# Patient Record
Sex: FEMALE | Race: WHITE | NOT HISPANIC OR LATINO | Employment: FULL TIME | URBAN - METROPOLITAN AREA
[De-identification: names, ages, dates, MRNs, and addresses within clinical notes are randomized per-mention and may not be internally consistent; named-entity substitution may affect disease eponyms.]

---

## 2023-09-20 PROBLEM — M54.2 NECK PAIN: Status: ACTIVE | Noted: 2023-09-20

## 2023-09-20 PROBLEM — M54.50 LOW BACK PAIN: Status: ACTIVE | Noted: 2023-09-20

## 2023-09-20 PROBLEM — H57.9 EYE PROBLEMS: Status: ACTIVE | Noted: 2023-09-20

## 2023-09-20 PROBLEM — A49.01 MSSA (METHICILLIN SUSCEPTIBLE STAPHYLOCOCCUS AUREUS): Status: ACTIVE | Noted: 2023-09-20

## 2023-09-20 PROBLEM — M25.541 PAIN INVOLVING JOINT OF FINGER OF RIGHT HAND: Status: ACTIVE | Noted: 2023-09-20

## 2023-09-20 PROBLEM — M54.16 LUMBAR RADICULOPATHY: Status: ACTIVE | Noted: 2023-09-20

## 2023-09-20 PROBLEM — M71.38 SYNOVIAL CYST OF LUMBAR SPINE: Status: ACTIVE | Noted: 2023-09-20

## 2023-09-20 RX ORDER — ALPRAZOLAM 0.5 MG/1
TABLET ORAL
COMMUNITY
Start: 2022-02-14 | End: 2024-03-13 | Stop reason: WASHOUT

## 2023-09-20 RX ORDER — LIFITEGRAST 50 MG/ML
SOLUTION/ DROPS OPHTHALMIC
COMMUNITY
Start: 2021-06-11

## 2023-09-20 RX ORDER — CYCLOBENZAPRINE HCL 5 MG
1 TABLET ORAL EVERY 12 HOURS
COMMUNITY
Start: 2022-06-01 | End: 2024-03-13 | Stop reason: WASHOUT

## 2023-09-20 RX ORDER — CITALOPRAM 20 MG/1
20 TABLET, FILM COATED ORAL DAILY
COMMUNITY
Start: 2022-02-22

## 2023-09-20 RX ORDER — CELECOXIB 200 MG/1
1 CAPSULE ORAL
COMMUNITY
Start: 2022-07-27

## 2023-10-20 ENCOUNTER — ANCILLARY PROCEDURE (OUTPATIENT)
Dept: RADIOLOGY | Facility: CLINIC | Age: 53
End: 2023-10-20
Payer: COMMERCIAL

## 2023-10-20 ENCOUNTER — OFFICE VISIT (OUTPATIENT)
Dept: ORTHOPEDIC SURGERY | Facility: CLINIC | Age: 53
End: 2023-10-20
Payer: COMMERCIAL

## 2023-10-20 DIAGNOSIS — M54.50 LOW BACK PAIN, UNSPECIFIED BACK PAIN LATERALITY, UNSPECIFIED CHRONICITY, UNSPECIFIED WHETHER SCIATICA PRESENT: Primary | ICD-10-CM

## 2023-10-20 DIAGNOSIS — M54.50 LOW BACK PAIN, UNSPECIFIED BACK PAIN LATERALITY, UNSPECIFIED CHRONICITY, UNSPECIFIED WHETHER SCIATICA PRESENT: ICD-10-CM

## 2023-10-20 DIAGNOSIS — M54.2 NECK PAIN: ICD-10-CM

## 2023-10-20 PROCEDURE — 72100 X-RAY EXAM L-S SPINE 2/3 VWS: CPT | Performed by: ORTHOPAEDIC SURGERY

## 2023-10-20 PROCEDURE — 99213 OFFICE O/P EST LOW 20 MIN: CPT | Performed by: ORTHOPAEDIC SURGERY

## 2023-10-20 PROCEDURE — 72100 X-RAY EXAM L-S SPINE 2/3 VWS: CPT | Mod: FY

## 2023-10-20 NOTE — PROGRESS NOTES
Subjective   Patient ID: Julissa Chaudhari is a 53 y.o. female who presents for follow up. 1 year out from L4-5 open TLIF.    HPI:  53-year-old female here for follow-up.  She is 1 year out from an L4-5 open TLIF.  She is doing well.    Physical exam:  Well-nourished, well kept.  Good perfusion to the lower extremities bilaterally.  Dorsalis pedis pulses 2+.  Capillary refill to the digits brisk.  No distal edema.  Gait normal.  Can walk on heels and toes.  Examination of the neck reveals no swelling, step-off, or point tenderness.  Range of motion with flexion, extension, side bending and rotation is well maintained without crepitance, instability, or exacerbation of pain.  Strength is within normal limits.  Examination of the back reveals no swelling, step-off, or point tenderness.  Range of motion with flexion, extension, side bending and rotation is well maintained without crepitance, instability, or exacerbation of pain.  Strength is within normal limits.  Examination of the lower extremities reveals no point tenderness, swelling, or deformity.  Range of motion of the hips, knees, and ankles are full without crepitance, instability, or exacerbation of pain.  Strength is 5/5 throughout.  No redness, abrasions, or lesions on all 4 extremities, head and neck, or trunk.  Gross sensation intact in the extremities ×4.   Kulwant, clonus were negative.  Affect normal.  Alert and oriented ×3.  Coordination normal.    Assessment:  53-year-old female is here for follow-up she is 1 year out from a L4-5 open TLIF.  She is doing great.  99% better.  She was able to find a specialist that was able to create a carbon fiber brace for her left ankle injury allowing her to be able to walk normally now.  She is happy with the outcome of the surgery.    Plan:    For complete plan and/or surgical details, please refer to Dr. Winston's portion of this split dictation.      In a face-to-face encounter, I performed a history and  physical examination, discussed pertinent diagnostic studies if indicated, and discussed diagnosis and management strategies with both the patient and the midlevel provider.  I reviewed the midlevel's note and agree with the documented findings and plan of care.      Significantly better compared to before surgery.  Still has that chronic left foot drop.  She has an AFO that she likes very much.  Has a little bit of pain in that right leg but very tolerable.  She is very happy with how she is doing.  X-rays look good.    1 year out from an open L4-5 TLIF.  She can engage in activities as tolerated.  She is having some neck pain so we will give her prescription for therapy for that.  She can follow-up with us on a as needed basis for her neck or her back.

## 2024-03-02 ENCOUNTER — ANESTHESIA EVENT (OUTPATIENT)
Dept: OPERATING ROOM | Facility: HOSPITAL | Age: 54
End: 2024-03-02
Payer: COMMERCIAL

## 2024-03-02 ENCOUNTER — PREP FOR PROCEDURE (OUTPATIENT)
Dept: SURGERY | Facility: HOSPITAL | Age: 54
End: 2024-03-02

## 2024-03-02 ENCOUNTER — APPOINTMENT (OUTPATIENT)
Dept: RADIOLOGY | Facility: HOSPITAL | Age: 54
End: 2024-03-02
Payer: COMMERCIAL

## 2024-03-02 ENCOUNTER — ANESTHESIA (OUTPATIENT)
Dept: OPERATING ROOM | Facility: HOSPITAL | Age: 54
End: 2024-03-02
Payer: COMMERCIAL

## 2024-03-02 ENCOUNTER — APPOINTMENT (OUTPATIENT)
Dept: CARDIOLOGY | Facility: HOSPITAL | Age: 54
End: 2024-03-02
Payer: COMMERCIAL

## 2024-03-02 ENCOUNTER — HOSPITAL ENCOUNTER (EMERGENCY)
Facility: HOSPITAL | Age: 54
Discharge: HOME | End: 2024-03-02
Attending: STUDENT IN AN ORGANIZED HEALTH CARE EDUCATION/TRAINING PROGRAM
Payer: COMMERCIAL

## 2024-03-02 VITALS
SYSTOLIC BLOOD PRESSURE: 156 MMHG | OXYGEN SATURATION: 98 % | HEIGHT: 68 IN | WEIGHT: 194 LBS | RESPIRATION RATE: 18 BRPM | BODY MASS INDEX: 29.4 KG/M2 | HEART RATE: 70 BPM | TEMPERATURE: 97.5 F | DIASTOLIC BLOOD PRESSURE: 95 MMHG

## 2024-03-02 DIAGNOSIS — K61.1 PERIRECTAL ABSCESS: Primary | ICD-10-CM

## 2024-03-02 DIAGNOSIS — K61.0 PERIANAL ABSCESS: Primary | ICD-10-CM

## 2024-03-02 DIAGNOSIS — K61.1 PERIRECTAL ABSCESS: ICD-10-CM

## 2024-03-02 PROBLEM — F41.9 ANXIETY: Status: ACTIVE | Noted: 2024-03-02

## 2024-03-02 PROBLEM — E11.9 DIABETES MELLITUS (MULTI): Status: ACTIVE | Noted: 2024-03-02

## 2024-03-02 LAB
ALBUMIN SERPL BCP-MCNC: 4.3 G/DL (ref 3.4–5)
ALP SERPL-CCNC: 79 U/L (ref 33–110)
ALT SERPL W P-5'-P-CCNC: 10 U/L (ref 7–45)
ANION GAP SERPL CALC-SCNC: 13 MMOL/L (ref 10–20)
AST SERPL W P-5'-P-CCNC: 11 U/L (ref 9–39)
BASOPHILS # BLD AUTO: 0.05 X10*3/UL (ref 0–0.1)
BASOPHILS NFR BLD AUTO: 0.4 %
BILIRUB SERPL-MCNC: 0.6 MG/DL (ref 0–1.2)
BUN SERPL-MCNC: 10 MG/DL (ref 6–23)
CALCIUM SERPL-MCNC: 8.9 MG/DL (ref 8.6–10.3)
CHLORIDE SERPL-SCNC: 102 MMOL/L (ref 98–107)
CO2 SERPL-SCNC: 23 MMOL/L (ref 21–32)
CREAT SERPL-MCNC: 0.71 MG/DL (ref 0.5–1.05)
EGFRCR SERPLBLD CKD-EPI 2021: >90 ML/MIN/1.73M*2
EOSINOPHIL # BLD AUTO: 0.16 X10*3/UL (ref 0–0.7)
EOSINOPHIL NFR BLD AUTO: 1.4 %
ERYTHROCYTE [DISTWIDTH] IN BLOOD BY AUTOMATED COUNT: 12.9 % (ref 11.5–14.5)
GLUCOSE SERPL-MCNC: 99 MG/DL (ref 74–99)
HCT VFR BLD AUTO: 39.8 % (ref 36–46)
HGB BLD-MCNC: 13.2 G/DL (ref 12–16)
IMM GRANULOCYTES # BLD AUTO: 0.03 X10*3/UL (ref 0–0.7)
IMM GRANULOCYTES NFR BLD AUTO: 0.3 % (ref 0–0.9)
LYMPHOCYTES # BLD AUTO: 1.56 X10*3/UL (ref 1.2–4.8)
LYMPHOCYTES NFR BLD AUTO: 14 %
MCH RBC QN AUTO: 31.5 PG (ref 26–34)
MCHC RBC AUTO-ENTMCNC: 33.2 G/DL (ref 32–36)
MCV RBC AUTO: 95 FL (ref 80–100)
MONOCYTES # BLD AUTO: 0.89 X10*3/UL (ref 0.1–1)
MONOCYTES NFR BLD AUTO: 8 %
NEUTROPHILS # BLD AUTO: 8.47 X10*3/UL (ref 1.2–7.7)
NEUTROPHILS NFR BLD AUTO: 75.9 %
NRBC BLD-RTO: 0 /100 WBCS (ref 0–0)
PLATELET # BLD AUTO: 338 X10*3/UL (ref 150–450)
POTASSIUM SERPL-SCNC: 3.9 MMOL/L (ref 3.5–5.3)
PROT SERPL-MCNC: 7.5 G/DL (ref 6.4–8.2)
RBC # BLD AUTO: 4.19 X10*6/UL (ref 4–5.2)
SODIUM SERPL-SCNC: 134 MMOL/L (ref 136–145)
WBC # BLD AUTO: 11.2 X10*3/UL (ref 4.4–11.3)

## 2024-03-02 PROCEDURE — 2720000007 HC OR 272 NO HCPCS: Performed by: SURGERY

## 2024-03-02 PROCEDURE — 74177 CT ABD & PELVIS W/CONTRAST: CPT

## 2024-03-02 PROCEDURE — 7100000009 HC PHASE TWO TIME - INITIAL BASE CHARGE: Performed by: SURGERY

## 2024-03-02 PROCEDURE — 84075 ASSAY ALKALINE PHOSPHATASE: CPT | Performed by: STUDENT IN AN ORGANIZED HEALTH CARE EDUCATION/TRAINING PROGRAM

## 2024-03-02 PROCEDURE — 99285 EMERGENCY DEPT VISIT HI MDM: CPT | Mod: 25 | Performed by: SURGERY

## 2024-03-02 PROCEDURE — 99284 EMERGENCY DEPT VISIT MOD MDM: CPT | Performed by: REGISTERED NURSE

## 2024-03-02 PROCEDURE — 2500000004 HC RX 250 GENERAL PHARMACY W/ HCPCS (ALT 636 FOR OP/ED): Performed by: STUDENT IN AN ORGANIZED HEALTH CARE EDUCATION/TRAINING PROGRAM

## 2024-03-02 PROCEDURE — 3600000008 HC OR TIME - EACH INCREMENTAL 1 MINUTE - PROCEDURE LEVEL THREE: Performed by: SURGERY

## 2024-03-02 PROCEDURE — 3600000003 HC OR TIME - INITIAL BASE CHARGE - PROCEDURE LEVEL THREE: Performed by: SURGERY

## 2024-03-02 PROCEDURE — 99140 ANES COMP EMERGENCY COND: CPT | Performed by: ANESTHESIOLOGY

## 2024-03-02 PROCEDURE — 46040 I&D ISCHIORCT&/PERIRCT ABSC: CPT | Performed by: SURGERY

## 2024-03-02 PROCEDURE — 2500000005 HC RX 250 GENERAL PHARMACY W/O HCPCS: Performed by: ANESTHESIOLOGIST ASSISTANT

## 2024-03-02 PROCEDURE — 2500000001 HC RX 250 WO HCPCS SELF ADMINISTERED DRUGS (ALT 637 FOR MEDICARE OP): Performed by: STUDENT IN AN ORGANIZED HEALTH CARE EDUCATION/TRAINING PROGRAM

## 2024-03-02 PROCEDURE — 2550000001 HC RX 255 CONTRASTS: Performed by: STUDENT IN AN ORGANIZED HEALTH CARE EDUCATION/TRAINING PROGRAM

## 2024-03-02 PROCEDURE — 7100000001 HC RECOVERY ROOM TIME - INITIAL BASE CHARGE: Performed by: SURGERY

## 2024-03-02 PROCEDURE — 85025 COMPLETE CBC W/AUTO DIFF WBC: CPT | Performed by: STUDENT IN AN ORGANIZED HEALTH CARE EDUCATION/TRAINING PROGRAM

## 2024-03-02 PROCEDURE — A46040 PR I AND D PERIRECTAL ABSCESS: Performed by: ANESTHESIOLOGIST ASSISTANT

## 2024-03-02 PROCEDURE — A46040 PR I AND D PERIRECTAL ABSCESS: Performed by: ANESTHESIOLOGY

## 2024-03-02 PROCEDURE — 7100000002 HC RECOVERY ROOM TIME - EACH INCREMENTAL 1 MINUTE: Performed by: SURGERY

## 2024-03-02 PROCEDURE — 7100000010 HC PHASE TWO TIME - EACH INCREMENTAL 1 MINUTE: Performed by: SURGERY

## 2024-03-02 PROCEDURE — 96365 THER/PROPH/DIAG IV INF INIT: CPT | Mod: 59 | Performed by: SURGERY

## 2024-03-02 PROCEDURE — 93005 ELECTROCARDIOGRAM TRACING: CPT | Mod: 59

## 2024-03-02 PROCEDURE — 99223 1ST HOSP IP/OBS HIGH 75: CPT | Performed by: SURGERY

## 2024-03-02 PROCEDURE — 96375 TX/PRO/DX INJ NEW DRUG ADDON: CPT | Mod: 59 | Performed by: SURGERY

## 2024-03-02 PROCEDURE — 74177 CT ABD & PELVIS W/CONTRAST: CPT | Performed by: RADIOLOGY

## 2024-03-02 PROCEDURE — 3700000002 HC GENERAL ANESTHESIA TIME - EACH INCREMENTAL 1 MINUTE: Performed by: SURGERY

## 2024-03-02 PROCEDURE — 87185 SC STD ENZYME DETCJ PER NZM: CPT | Mod: PARLAB | Performed by: SURGERY

## 2024-03-02 PROCEDURE — 36415 COLL VENOUS BLD VENIPUNCTURE: CPT | Performed by: STUDENT IN AN ORGANIZED HEALTH CARE EDUCATION/TRAINING PROGRAM

## 2024-03-02 PROCEDURE — 2500000005 HC RX 250 GENERAL PHARMACY W/O HCPCS: Performed by: SURGERY

## 2024-03-02 PROCEDURE — 2500000005 HC RX 250 GENERAL PHARMACY W/O HCPCS: Performed by: STUDENT IN AN ORGANIZED HEALTH CARE EDUCATION/TRAINING PROGRAM

## 2024-03-02 PROCEDURE — 2500000004 HC RX 250 GENERAL PHARMACY W/ HCPCS (ALT 636 FOR OP/ED): Performed by: ANESTHESIOLOGIST ASSISTANT

## 2024-03-02 PROCEDURE — 3700000001 HC GENERAL ANESTHESIA TIME - INITIAL BASE CHARGE: Performed by: SURGERY

## 2024-03-02 RX ORDER — DIPHENHYDRAMINE HYDROCHLORIDE 50 MG/ML
12.5 INJECTION INTRAMUSCULAR; INTRAVENOUS ONCE AS NEEDED
Status: DISCONTINUED | OUTPATIENT
Start: 2024-03-02 | End: 2024-03-02 | Stop reason: HOSPADM

## 2024-03-02 RX ORDER — FENTANYL CITRATE 50 UG/ML
INJECTION, SOLUTION INTRAMUSCULAR; INTRAVENOUS AS NEEDED
Status: DISCONTINUED | OUTPATIENT
Start: 2024-03-02 | End: 2024-03-02

## 2024-03-02 RX ORDER — PROPOFOL 10 MG/ML
INJECTION, EMULSION INTRAVENOUS AS NEEDED
Status: DISCONTINUED | OUTPATIENT
Start: 2024-03-02 | End: 2024-03-02

## 2024-03-02 RX ORDER — MEPERIDINE HYDROCHLORIDE 25 MG/ML
12.5 INJECTION INTRAMUSCULAR; INTRAVENOUS; SUBCUTANEOUS EVERY 10 MIN PRN
Status: DISCONTINUED | OUTPATIENT
Start: 2024-03-02 | End: 2024-03-02 | Stop reason: HOSPADM

## 2024-03-02 RX ORDER — SODIUM CHLORIDE, SODIUM LACTATE, POTASSIUM CHLORIDE, CALCIUM CHLORIDE 600; 310; 30; 20 MG/100ML; MG/100ML; MG/100ML; MG/100ML
100 INJECTION, SOLUTION INTRAVENOUS CONTINUOUS
Status: DISCONTINUED | OUTPATIENT
Start: 2024-03-02 | End: 2024-03-02 | Stop reason: HOSPADM

## 2024-03-02 RX ORDER — KETOROLAC TROMETHAMINE 30 MG/ML
INJECTION, SOLUTION INTRAMUSCULAR; INTRAVENOUS AS NEEDED
Status: DISCONTINUED | OUTPATIENT
Start: 2024-03-02 | End: 2024-03-02

## 2024-03-02 RX ORDER — TRAMADOL HYDROCHLORIDE 50 MG/1
50 TABLET ORAL EVERY 6 HOURS PRN
Qty: 12 TABLET | Refills: 0 | Status: SHIPPED | OUTPATIENT
Start: 2024-03-02 | End: 2024-03-03 | Stop reason: SDUPTHER

## 2024-03-02 RX ORDER — IPRATROPIUM BROMIDE 0.5 MG/2.5ML
500 SOLUTION RESPIRATORY (INHALATION) ONCE
Status: DISCONTINUED | OUTPATIENT
Start: 2024-03-02 | End: 2024-03-02 | Stop reason: HOSPADM

## 2024-03-02 RX ORDER — ONDANSETRON HYDROCHLORIDE 2 MG/ML
INJECTION, SOLUTION INTRAVENOUS AS NEEDED
Status: DISCONTINUED | OUTPATIENT
Start: 2024-03-02 | End: 2024-03-02

## 2024-03-02 RX ORDER — BUPIVACAINE HYDROCHLORIDE 5 MG/ML
INJECTION, SOLUTION PERINEURAL AS NEEDED
Status: DISCONTINUED | OUTPATIENT
Start: 2024-03-02 | End: 2024-03-02 | Stop reason: HOSPADM

## 2024-03-02 RX ORDER — ACETAMINOPHEN 325 MG/1
650 TABLET ORAL EVERY 4 HOURS PRN
Status: DISCONTINUED | OUTPATIENT
Start: 2024-03-02 | End: 2024-03-02 | Stop reason: HOSPADM

## 2024-03-02 RX ORDER — ALBUTEROL SULFATE 0.83 MG/ML
2.5 SOLUTION RESPIRATORY (INHALATION) ONCE AS NEEDED
Status: DISCONTINUED | OUTPATIENT
Start: 2024-03-02 | End: 2024-03-02 | Stop reason: HOSPADM

## 2024-03-02 RX ORDER — ACETAMINOPHEN 325 MG/1
975 TABLET ORAL ONCE
Status: COMPLETED | OUTPATIENT
Start: 2024-03-02 | End: 2024-03-02

## 2024-03-02 RX ORDER — LIDOCAINE HYDROCHLORIDE 10 MG/ML
0.1 INJECTION INFILTRATION; PERINEURAL ONCE
Status: DISCONTINUED | OUTPATIENT
Start: 2024-03-02 | End: 2024-03-02 | Stop reason: HOSPADM

## 2024-03-02 RX ORDER — DEXAMETHASONE SODIUM PHOSPHATE 10 MG/ML
6 INJECTION INTRAMUSCULAR; INTRAVENOUS ONCE
Status: DISCONTINUED | OUTPATIENT
Start: 2024-03-02 | End: 2024-03-02 | Stop reason: HOSPADM

## 2024-03-02 RX ORDER — ONDANSETRON HYDROCHLORIDE 2 MG/ML
4 INJECTION, SOLUTION INTRAVENOUS ONCE
Status: COMPLETED | OUTPATIENT
Start: 2024-03-02 | End: 2024-03-02

## 2024-03-02 RX ORDER — ONDANSETRON HYDROCHLORIDE 2 MG/ML
4 INJECTION, SOLUTION INTRAVENOUS ONCE AS NEEDED
Status: DISCONTINUED | OUTPATIENT
Start: 2024-03-02 | End: 2024-03-02 | Stop reason: HOSPADM

## 2024-03-02 RX ORDER — LIDOCAINE HCL/PF 100 MG/5ML
SYRINGE (ML) INTRAVENOUS AS NEEDED
Status: DISCONTINUED | OUTPATIENT
Start: 2024-03-02 | End: 2024-03-02

## 2024-03-02 RX ORDER — KETOROLAC TROMETHAMINE 30 MG/ML
30 INJECTION, SOLUTION INTRAMUSCULAR; INTRAVENOUS ONCE AS NEEDED
Status: DISCONTINUED | OUTPATIENT
Start: 2024-03-02 | End: 2024-03-02 | Stop reason: HOSPADM

## 2024-03-02 RX ORDER — DEXAMETHASONE SODIUM PHOSPHATE 4 MG/ML
INJECTION, SOLUTION INTRA-ARTICULAR; INTRALESIONAL; INTRAMUSCULAR; INTRAVENOUS; SOFT TISSUE AS NEEDED
Status: DISCONTINUED | OUTPATIENT
Start: 2024-03-02 | End: 2024-03-02

## 2024-03-02 RX ORDER — MIDAZOLAM HYDROCHLORIDE 1 MG/ML
INJECTION, SOLUTION INTRAMUSCULAR; INTRAVENOUS AS NEEDED
Status: DISCONTINUED | OUTPATIENT
Start: 2024-03-02 | End: 2024-03-02

## 2024-03-02 RX ORDER — LIDOCAINE 560 MG/1
1 PATCH PERCUTANEOUS; TOPICAL; TRANSDERMAL DAILY
Status: DISCONTINUED | OUTPATIENT
Start: 2024-03-02 | End: 2024-03-02 | Stop reason: HOSPADM

## 2024-03-02 RX ORDER — AMOXICILLIN AND CLAVULANATE POTASSIUM 875; 125 MG/1; MG/1
875 TABLET, FILM COATED ORAL 2 TIMES DAILY
Qty: 14 TABLET | Refills: 0 | Status: SHIPPED | OUTPATIENT
Start: 2024-03-02 | End: 2024-03-09

## 2024-03-02 RX ORDER — OXYCODONE HYDROCHLORIDE 5 MG/1
5 TABLET ORAL ONCE
Status: COMPLETED | OUTPATIENT
Start: 2024-03-02 | End: 2024-03-02

## 2024-03-02 RX ADMIN — DEXAMETHASONE SODIUM PHOSPHATE 4 MG: 4 INJECTION, SOLUTION INTRAMUSCULAR; INTRAVENOUS at 14:32

## 2024-03-02 RX ADMIN — ONDANSETRON 4 MG: 2 INJECTION INTRAMUSCULAR; INTRAVENOUS at 12:47

## 2024-03-02 RX ADMIN — FENTANYL CITRATE 50 MCG: 50 INJECTION, SOLUTION INTRAMUSCULAR; INTRAVENOUS at 14:55

## 2024-03-02 RX ADMIN — ACETAMINOPHEN 975 MG: 325 TABLET ORAL at 09:13

## 2024-03-02 RX ADMIN — PROPOFOL 200 MG: 10 INJECTION, EMULSION INTRAVENOUS at 14:27

## 2024-03-02 RX ADMIN — LIDOCAINE 1 PATCH: 4 PATCH TOPICAL at 09:13

## 2024-03-02 RX ADMIN — KETOROLAC TROMETHAMINE 30 MG: 30 INJECTION, SOLUTION INTRAMUSCULAR; INTRAVENOUS at 15:04

## 2024-03-02 RX ADMIN — LIDOCAINE HYDROCHLORIDE 100 MG: 20 INJECTION INTRAVENOUS at 14:27

## 2024-03-02 RX ADMIN — SODIUM CHLORIDE, POTASSIUM CHLORIDE, SODIUM LACTATE AND CALCIUM CHLORIDE: 600; 310; 30; 20 INJECTION, SOLUTION INTRAVENOUS at 14:21

## 2024-03-02 RX ADMIN — ACETAMINOPHEN 650 MG: 325 TABLET ORAL at 16:01

## 2024-03-02 RX ADMIN — MIDAZOLAM 2 MG: 1 INJECTION INTRAMUSCULAR; INTRAVENOUS at 14:21

## 2024-03-02 RX ADMIN — HYDROMORPHONE HYDROCHLORIDE 0.5 MG: 1 INJECTION, SOLUTION INTRAMUSCULAR; INTRAVENOUS; SUBCUTANEOUS at 12:47

## 2024-03-02 RX ADMIN — IOHEXOL 80 ML: 350 INJECTION, SOLUTION INTRAVENOUS at 10:49

## 2024-03-02 RX ADMIN — FENTANYL CITRATE 100 MCG: 50 INJECTION, SOLUTION INTRAMUSCULAR; INTRAVENOUS at 14:27

## 2024-03-02 RX ADMIN — OXYCODONE HYDROCHLORIDE 5 MG: 5 TABLET ORAL at 09:13

## 2024-03-02 RX ADMIN — ONDANSETRON 4 MG: 2 INJECTION INTRAMUSCULAR; INTRAVENOUS at 15:04

## 2024-03-02 RX ADMIN — PIPERACILLIN SODIUM AND TAZOBACTAM SODIUM 3.38 G: 3; .375 INJECTION, SOLUTION INTRAVENOUS at 13:58

## 2024-03-02 SDOH — HEALTH STABILITY: MENTAL HEALTH: CURRENT SMOKER: 0

## 2024-03-02 ASSESSMENT — PAIN - FUNCTIONAL ASSESSMENT
PAIN_FUNCTIONAL_ASSESSMENT: 0-10

## 2024-03-02 ASSESSMENT — PAIN SCALES - GENERAL
PAINLEVEL_OUTOF10: 10 - WORST POSSIBLE PAIN
PAINLEVEL_OUTOF10: 5 - MODERATE PAIN
PAINLEVEL_OUTOF10: 0 - NO PAIN
PAINLEVEL_OUTOF10: 0 - NO PAIN
PAIN_LEVEL: 0
PAINLEVEL_OUTOF10: 0 - NO PAIN
PAINLEVEL_OUTOF10: 7

## 2024-03-02 ASSESSMENT — COLUMBIA-SUICIDE SEVERITY RATING SCALE - C-SSRS
2. HAVE YOU ACTUALLY HAD ANY THOUGHTS OF KILLING YOURSELF?: NO
1. IN THE PAST MONTH, HAVE YOU WISHED YOU WERE DEAD OR WISHED YOU COULD GO TO SLEEP AND NOT WAKE UP?: NO
6. HAVE YOU EVER DONE ANYTHING, STARTED TO DO ANYTHING, OR PREPARED TO DO ANYTHING TO END YOUR LIFE?: NO

## 2024-03-02 ASSESSMENT — LIFESTYLE VARIABLES
EVER HAD A DRINK FIRST THING IN THE MORNING TO STEADY YOUR NERVES TO GET RID OF A HANGOVER: NO
HAVE YOU EVER FELT YOU SHOULD CUT DOWN ON YOUR DRINKING: NO
EVER FELT BAD OR GUILTY ABOUT YOUR DRINKING: NO
HAVE PEOPLE ANNOYED YOU BY CRITICIZING YOUR DRINKING: NO

## 2024-03-02 ASSESSMENT — PAIN DESCRIPTION - LOCATION: LOCATION: BUTTOCKS

## 2024-03-02 NOTE — ANESTHESIA PREPROCEDURE EVALUATION
Patient: Julissa Chaudhari    Procedure Information       Date/Time: 03/02/24 1414    Procedure: Incision and Drainage Anus/Rectum (Left) - Rectal exam under anesthesia; incision and drainage of left ischial rectal abscess; general anesthesia;  lithotomy position with candycane stirrups    Location: [facility] 02 / [facility]    Surgeons: Lexa Pillai MD            Relevant Problems   Neuro/Psych   (+) Anxiety   (+) Lumbar radiculopathy   (+) Panic attacks      Infectious Disease   (+) MSSA (methicillin susceptible Staphylococcus aureus)       Clinical information reviewed:    Allergies   Problems              NPO Detail:  No data recorded     Physical Exam    Airway  Mallampati: IV  TM distance: <3 FB  Neck ROM: full     Cardiovascular - normal exam  Rhythm: regular  Rate: normal     Dental - normal exam     Pulmonary - normal exam     Abdominal            Anesthesia Plan    History of general anesthesia?: yes  History of complications of general anesthesia?: no    ASA 3 - emergent     general     The patient is not a current smoker.    intravenous induction   Postoperative administration of opioids is intended.  Trial extubation is planned.  Anesthetic plan and risks discussed with patient.  Use of blood products discussed with patient who consented to blood products.    Plan discussed with [facility]AA.

## 2024-03-02 NOTE — DISCHARGE INSTRUCTIONS
For postoperative analgesia/pain relief, I recommend:     a.  Tylenol Extra Strength 500 mg with ibuprofen 400 - 600 mg (two or three, 200 mg Advil or Motrin tablets ) 4 times a day with food, on schedule, for 2-3 days, then as needed thereafter.      b.  Supplement with Tramadol 50 mg, dispense #12 for more severe or breakthrough pain, as needed.  This has been electronically prescribed to your pharmacy.  Please  this prescription within 7 days of today's visit, or the pharmacist will not fill the prescription.

## 2024-03-02 NOTE — ANESTHESIA POSTPROCEDURE EVALUATION
Patient: Julissa Chaudhari    Procedure Summary       Date: 03/02/24 Room / Location: [facility] / [facility]    Anesthesia Start: 1421 Anesthesia Stop: 1517    Procedure: Incision and Drainage Anus/Rectum (Left) Diagnosis:       Perirectal abscess      (Perirectal abscess [K61.1])    Surgeons: Lexa Pillai MD Responsible Provider: Spenser Ford MD    Anesthesia Type: general ASA Status: 3 - Emergent            Anesthesia Type: general    Vitals Value Taken Time   /64 03/02/24 1515   Temp 36.4 °C (97.5 °F) 03/02/24 1515   Pulse 73 03/02/24 1515   Resp 16 03/02/24 1515   SpO2 100 % 03/02/24 1515       Anesthesia Post Evaluation    Patient location during evaluation: PACU  Patient participation: complete - patient participated  Level of consciousness: awake and alert  Pain score: 0  Pain management: adequate  Airway patency: patent  Cardiovascular status: acceptable  Respiratory status: acceptable  Hydration status: acceptable  Postoperative Nausea and Vomiting: none        There were no known notable events for this encounter.

## 2024-03-02 NOTE — H&P
History Of Present Illness  Julissa Chaudhari is a 53 y.o. female who presented to [facility] ED on 3/2 with worsening rectal pain. She has a history of anal fistula s/p repair in 2014. Earlier this week, around Tuesday or Wednesday, patient noticed a mild pain in her rectum. At first, she thought she possibly had hemorrhoids. Attempted to use Preparation H, but this did not help. While applying the cream on Thursday night, she noticed a lump just next to her previous scar. She never noted any blood in her stool or drainage from rectum. Developed chills on Friday night after getting home from work. The pain became so severe Friday; rates it as 10/10 and states she couldn't walk, sit down, or get comfortable in any way. Came to the ED this morning after having near syncope in the shower. Denies history of syncope. Felt lightheaded and dizzy; attributed this to hot shower and degree of pain in rectum.   Even after IV hydromorphone and oxycodone in ED, still rates pain as 7/10. All labs normal except for elevated absolute neutrophil count. CT A/P concerning for perianal abscess with inflammatory changes and central density measuring 3x2cm. Despite relatively large fluid collection noted on CT, on physical exam this is not easily palpable; however, patient with thickened scar tissue from fistula scar.      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 Lives in [place] with friend Yanely    Family History  No family history on file.     Allergies  Patient has no known allergies.    Review of Systems  "  All other systems reviewed and are negative.       Physical Exam  Constitutional:       General: She is not in acute distress.     Appearance: She is normal weight. She is not ill-appearing or toxic-appearing.   HENT:      Head: Normocephalic.      Mouth/Throat:      Mouth: Mucous membranes are moist.   Eyes:      General: No scleral icterus.     Conjunctiva/sclera: Conjunctivae normal.      Pupils: Pupils are equal, round, and reactive to light.   Cardiovascular:      Rate and Rhythm: Normal rate and regular rhythm.      Pulses: Normal pulses.      Heart sounds: No murmur heard.  Pulmonary:      Effort: Pulmonary effort is normal. No respiratory distress.      Breath sounds: Normal breath sounds.   Abdominal:      General: Bowel sounds are normal. There is no distension.      Palpations: Abdomen is soft.      Tenderness: There is no abdominal tenderness.      Comments: Left perianal scar noted from prior surgery. Firm palpable scar tissue. Mild erythema only. Very tender to palpate. No drainage. No obvious palpable fluctuance    Skin:     General: Skin is warm and dry.      Coloration: Skin is not jaundiced.   Neurological:      Mental Status: She is alert and oriented to person, place, and time.   Psychiatric:         Mood and Affect: Mood normal.         Behavior: Behavior normal.          Last Recorded Vitals  Blood pressure 119/60, pulse 64, temperature 37 °C (98.6 °F), resp. rate 14, height 1.727 m (5' 8\"), weight 88 kg (194 lb), SpO2 99 %.    Relevant Results  Results for orders placed or performed during the hospital encounter of 03/02/24 (from the past 24 hour(s))   CBC and Auto Differential   Result Value Ref Range    WBC 11.2 4.4 - 11.3 x10*3/uL    nRBC 0.0 0.0 - 0.0 /100 WBCs    RBC 4.19 4.00 - 5.20 x10*6/uL    Hemoglobin 13.2 12.0 - 16.0 g/dL    Hematocrit 39.8 36.0 - 46.0 %    MCV 95 80 - 100 fL    MCH 31.5 26.0 - 34.0 pg    MCHC 33.2 32.0 - 36.0 g/dL    RDW 12.9 11.5 - 14.5 %    Platelets 338 150 - " 450 x10*3/uL    Neutrophils % 75.9 40.0 - 80.0 %    Immature Granulocytes %, Automated 0.3 0.0 - 0.9 %    Lymphocytes % 14.0 13.0 - 44.0 %    Monocytes % 8.0 2.0 - 10.0 %    Eosinophils % 1.4 0.0 - 6.0 %    Basophils % 0.4 0.0 - 2.0 %    Neutrophils Absolute 8.47 (H) 1.20 - 7.70 x10*3/uL    Immature Granulocytes Absolute, Automated 0.03 0.00 - 0.70 x10*3/uL    Lymphocytes Absolute 1.56 1.20 - 4.80 x10*3/uL    Monocytes Absolute 0.89 0.10 - 1.00 x10*3/uL    Eosinophils Absolute 0.16 0.00 - 0.70 x10*3/uL    Basophils Absolute 0.05 0.00 - 0.10 x10*3/uL   Comprehensive metabolic panel   Result Value Ref Range    Glucose 99 74 - 99 mg/dL    Sodium 134 (L) 136 - 145 mmol/L    Potassium 3.9 3.5 - 5.3 mmol/L    Chloride 102 98 - 107 mmol/L    Bicarbonate 23 21 - 32 mmol/L    Anion Gap 13 10 - 20 mmol/L    Urea Nitrogen 10 6 - 23 mg/dL    Creatinine 0.71 0.50 - 1.05 mg/dL    eGFR >90 >60 mL/min/1.73m*2    Calcium 8.9 8.6 - 10.3 mg/dL    Albumin 4.3 3.4 - 5.0 g/dL    Alkaline Phosphatase 79 33 - 110 U/L    Total Protein 7.5 6.4 - 8.2 g/dL    AST 11 9 - 39 U/L    Bilirubin, Total 0.6 0.0 - 1.2 mg/dL    ALT 10 7 - 45 U/L       CT abdomen pelvis w IV contrast  Result Date: 3/2/2024    1.  Findings suspicious for inferior perianal abscess/inflammatory tract although central density measurement is greater than simple fluid. Alternative cause such as hematoma or mass not definitively excluded. 2. Small liver hypodensities which are most likely incidental although too small to characterize. See below incidental findings prompt for recommendations.   Incidental Finding:  There is a hypodense lesion measuring less than 10 mm within the liver.  (**-YCF-**)   Instructions:  No known malignancy, hepatic dysfunction/risk factors: favor benign etiology, no further imaging recommended. High-risk patients: consider outpatient liver MRI in 3-6 months (or earlier if needed). Ricardo RM, Caty PJ, Amber CINTRON, et al. (Management of Incidental  Liver Lesions on CT: A White Paper of the ACR Incidental Findings Committee. J Am Mansi Radiol. 2017;14(11):7662-2126.) LIVER.ACR.IF.1   Signed by: Philippe España 3/2/2024 11:40 AM Dictation workstation:   [id number]          Assessment/Plan   Active Problems:  There are no active Hospital Problems.      53 year old female with a past medical history significant for anal fistula s/p repair (2014), HLD, colon polyps (removal of cecal adenoma 2023), chronic back pain s/p L4-5 surgery last year, and hemorrhoids presented to [facility] ED 3/2 with several days of worsening rectal pain.  Physical exam and CT findings consistent with perianal abscess. Plan for OR for exam under anesthesia and I&D.    #perianal abscess  - NPO  - IV antibiotics to be given in OR  - OR for exam under anesthesia  - I&D of fluid collection to be performed in OR; culture will be sent  - pending OR findings, could possibly be discharged from PACU. If admission indicated, will admit under obs status to surgery team    The patient was seen and discussed with the attending surgeon Dr. Pillai     I spent 30 minutes in the professional and overall care of this patient.  Greater than 50% of this time was spent counseling patient, reviewing plan of care, and in coordination of care.       Marisol Verdugo, NIXON-CNP

## 2024-03-02 NOTE — OP NOTE
Incision and Drainage Anus/Rectum (L) Operative Note     Date: 3/2/2024  OR Location: [facility]    Name: Julissa Chaudhari, : 1970, Age: 53 y.o., MRN: [id number], Sex: female    Diagnosis  Pre-op Diagnosis     * Perirectal abscess [K61.1] Post-op Diagnosis     * Perirectal abscess [K61.1]     Procedures  Incision and Drainage Anus/Rectum  38725 - SD I&D ISCHIORECTAL&/PERIRECTAL ABSCESS SPX    Rectal exam under anesthesia    Diagnostic anoscopy    Surgeons      * Lexa Pillai - Primary    Resident/Fellow/Other Assistant:  Surgeon(s) and Role: Bulmaro Dowling SA    Procedure Summary  Anesthesia: General  ASA: ASA status not filed in the log.  Anesthesia Staff: No anesthesia staff entered.  Estimated Blood Loss: Less than 5 mL  Intra-op Medications:   Administrations occurring from 1415 to 1445 on 24:   Medication Name Total Dose   piperacillin-tazobactam-dextrose (Zosyn) IV 3.375 g Cannot be calculated              Anesthesia Record               Intraprocedure I/O Totals       None           Specimen: No specimens collected     Staff:   Circulator: Angeli Awad RN  Scrub Person: Tommy Campoverde         Drains and/or Catheters: * None in log *    Tourniquet Times:         Implants:     Findings: See below    Indications: Julissa Chaudhari is an 53 y.o. female who is having surgery for Perirectal abscess [K61.1].     The patient was seen in the preoperative area. The risks, benefits, complications, treatment options, non-operative alternatives, expected recovery and outcomes were discussed with the patient. The possibilities of reaction to medication, pulmonary aspiration, injury to surrounding structures, bleeding, recurrent infection, the need for additional procedures, failure to diagnose a condition, and creating a complication requiring transfusion or operation were discussed with the patient. The patient concurred with the proposed plan, giving informed consent.  The site of surgery was properly  noted/marked if necessary per policy. The patient has been actively warmed in preoperative area. Preoperative antibiotics have been ordered and given within 1 hours of incision. Venous thrombosis prophylaxis have been ordered including bilateral sequential compression devices    Procedure Details:     Findings:   The patient had a firm 4 cm radial scar directly left laterally; there is very minimal erythema of the surrounding skin; a palpable fluctuant mass could not be palpated; no other signs on inspection; digital rectal exam revealed no bulge or mass along the anal canal particularly left laterally; diagnostic anoscopy was performed and revealed no evidence of an internal fistulous opening; this was essentially negative; beneath the scar, there was an abscess containing 6 cc of thick purulent fluid     Specimens:  None    Culture:  Abscess    Procedure:     The patient was taken to the operating room placed on the table in the supine position. Preoperative huddle was accomplished with the OR team and the patient. Satisfactory general endotracheal anesthesia was achieved.  The patient was placed in the perineal lithotomy position using candycane stirrups.  The  perianal region and perineum and external genitalia were prepared and draped in normal sterile fashion. The surgical site was marked by the surgeon preoperatively. After the appropriate timeout, the case commenced.     Using an 18-gauge needle and 10 cc syringe, the soft tissue beneath the scar was aspirated, and 6 cc of purulent fluid was obtained, and this was sent to microbiology for culture.      Secondary to the small size of the abscess, and the thick chronic scar, it was elected not to widely open this area through the scar.  2 cm lateral to the scar, a transverse counterincision was made roughly 2 cm in diameter.  Using a tonsil clamp, a tract was made medially to the abscess cavity.  This tract was opened.  No further purulent fluid was obtained.   Following this, the tract and abscess cavity was bluntly finger dissected and there were really no loculations or other remaining fluid.  This tract and abscess cavity were then copiously irrigated and suctioned with saline.  A 22 Bengali Pezzer (Malecot) drain was then placed along this tract, with a mushroom portion into the abscess cavity.  This was secured to the skin using a 2-0 nylon suture.  This area was cleaned and dried, and the exterior drain was packed and ABD pads and 4 x 4 gauze, followed by mesh panties.  This completed the operation.    The patient tolerated the procedure well. Sponge, needle, and instrument counts were correct times 2. Total IVF were 400 cc of crystalloid, EBL was < 5 cc, and urine output was not measured.  The patient was extubated in the operating room, and taken to the PACU with stable vital signs and in excellent condition.     Lexa Pillai M.D.      Complications:  None; patient tolerated the procedure well.    Disposition: PACU - hemodynamically stable.  Condition: stable         Additional Details: none    Attending Attestation: I performed the procedure.    Lexa Pillai  Phone Number: [phone]

## 2024-03-02 NOTE — ANESTHESIA PROCEDURE NOTES
Airway  Date/Time: 3/2/2024 2:28 PM  Urgency: elective    Airway not difficult    Staffing  Performed: RD   Authorized by: Spenser Ford MD    Performed by: RD Thomson  Patient location during procedure: OR    Indications and Patient Condition  Indications for airway management: anesthesia  Sedation level: deep  Preoxygenated: yes  Patient position: sniffing  MILS maintained throughout  Mask difficulty assessment: 0 - not attempted  Planned trial extubation    Final Airway Details  Final airway type: supraglottic airway      Successful airway: flexible  Size 4     Number of attempts at approach: 1    Additional Comments  Easy LMA 4 insertion. Lips and teeth in preanesthetic condition.

## 2024-03-02 NOTE — ED TRIAGE NOTES
Patient states pain to left of anus since Tuesday/Wednesday, hx of surgery on anal fissure there approx 10 years ago. Patient denies any blood in stool. Patient denies any issues having BMs. Patient had near syncopal episode in the shower this AM.

## 2024-03-02 NOTE — ED PROVIDER NOTES
"HPI   Chief Complaint   Patient presents with   • Rectal Pain       This is a 53-year-old female with past medical history of anal fistula status postrepair approximately 10 years ago, colonic polyps, hemorrhoids presenting the emergency department for rectal pain.  Patient states she had mild discomfort starting a couple days ago which worsened until last night.  This morning the pain was even worse prompting her to come to the emergency department.  Yesterday and today states she was \"walking funny\" secondary to the pain.  She had a normal/soft bowel movement yesterday without any straining.  Denies any recent constipation.  Denies any blood in the stool.  States the pain feels like it is near the scar from her past surgical site.  Denies any abdominal pain, nausea vomiting, fever/chills, chest pain, shortness of breath, urinary symptoms.        History provided by:  Patient   used: No                        Catherine Coma Scale Score: 15                     Patient History   No past medical history on file.  No past surgical history on file.  No family history on file.  Social History     Tobacco Use   • Smoking status: Not on file   • Smokeless tobacco: Not on file   Substance Use Topics   • Alcohol use: Not on file   • Drug use: Not on file       Physical Exam   ED Triage Vitals [03/02/24 0837]   Temperature Heart Rate Respirations BP   36.8 °C (98.2 °F) 86 16 123/59      Pulse Ox Temp src Heart Rate Source Patient Position   99 % -- -- --      BP Location FiO2 (%)     -- --       Physical Exam  GEN: well appearing, no acute distress  CVS/CHEST: reg rate, nl rhythm, no murmurs/gallops/rubs  PULM: CTAB b/l no wheezes, crackles, or rhonchi   GI: NT/ND, no masses or organomegaly, soft, no guarding  : Rectal exam largely unremarkable.  No signs of bleeding.  No visible hemorrhoids, left inferior medial gluteal cleft with well-healed surgical scar with mild tenderness palpation and induration but " without surrounding erythema  BACK: no CVA tenderness  NEURO: no focal deficits, no facial asymmetry, moving all extremities  PSYCH: AAOx3 answers questions appropriately  ED Course & MDM   ED Course as of 03/02/24 1337   Sat Mar 02, 2024   1224 Patient's lab work was unremarkable.  CT of the abdomen and pelvis did show a perianal abscess.  There is some concerns for potential tracking in location of patient's previous fistula.  Patient discussed with Dr. Pillai with general surgery who plans on taking patient to the OR for incision and drainage and plans from discharge from the OR.  Patient updated. [DE]   1237 EKG as interpreted by me: Sinus rhythm at 60 bpm, normal axis, intervals unremarkable, no significant ST elevations or depressions [DE]      ED Course User Index  [DE] Jonnathan Fish MD         Diagnoses as of 03/02/24 1337   Perianal abscess       Medical Decision Making  This is a 53-year-old female with past medical history of anal fistula status postrepair approximately 10 years ago, colonic polyps, hemorrhoids presenting the emergency department for rectal pain.  Patient stable upon presentation to the emergency department, no acute distress and vitals are unremarkable.  On exam patient is comfortable appearing.  Abdomen is nontender.  Rectal exam is reassuring without signs of bleeding or obvious abnormalities.  She does have some mild tenderness palpation and induration near her well-healed surgical scar without signs of skin changes.  Patient's pain to be treated in the emergency department.  Will obtain lab work and imaging with concern for potential deeper abnormality given her surgical history and pain.    Procedure  Procedures     Jonnathan Fish MD  03/02/24 1238       Jonnathan Fish MD  03/02/24 1337

## 2024-03-02 NOTE — H&P
General Surgery Attending Note    My Acute Care Surgery JEANETTE (Advanced Practice Provider) evaluated this patient today.  Please see that documentation for details.    I saw the patient with the JEANETTE today, and personally evaluated and examined the patient.  My findings are consistent with those of the JEANETTE.        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 Narendra Ruiz from the [facility] in a two-step procedure, who now presents with evidence of a left ischial rectal/perianal abscess.    On CAT scan imaging, the patient has a 3 x 2.5 x 2 cm abscess in the left ischial rectal fossa.  The wall appears to be somewhat thickened.    On examination, the patient has a well-healed thick radial left lateral perhaps 4 or 5 cm scar.  There is no drainage.  There is tenderness and erythema but no soft tissue induration per se.  The fluctuant mass cannot be palpated.    Overall impression is left lateral ischiorectal abscess at the same site as a previous transsphincteric anal fistula.      Plan:      Patient be taken from the emergency room to the operating room and undergo rectal exam under anesthesia, diagnostic anoscopy, and incision and drainage of the left lateral ischial rectal abscess.    Anorectal Surgery Consent: The procedure was explained to the patient in detail, including the risks, benefits and alternatives. The risks include, but are not limited to, infection, abscess, bleeding, hematoma, seroma, poor wound healing, persistent or recurrent symptoms, need for further surgery, fistula, key-hole defect and fecal incontinence.  The patient agreed with the plan and signed the electronic consent.

## 2024-03-03 ENCOUNTER — TELEPHONE (OUTPATIENT)
Dept: SURGERY | Facility: HOSPITAL | Age: 54
End: 2024-03-03
Payer: COMMERCIAL

## 2024-03-03 RX ORDER — TRAMADOL HYDROCHLORIDE 50 MG/1
50 TABLET ORAL EVERY 6 HOURS PRN
Qty: 12 TABLET | Refills: 0 | Status: SHIPPED | OUTPATIENT
Start: 2024-03-03 | End: 2024-03-10

## 2024-03-05 ENCOUNTER — APPOINTMENT (OUTPATIENT)
Dept: ORTHOPEDIC SURGERY | Facility: CLINIC | Age: 54
End: 2024-03-05
Payer: COMMERCIAL

## 2024-03-05 LAB
B-LACTAMASE ORGANISM ISLT: NEGATIVE
BACTERIA FLD CULT: ABNORMAL
BACTERIA FLD CULT: ABNORMAL
GRAM STN SPEC: ABNORMAL
GRAM STN SPEC: ABNORMAL

## 2024-03-10 LAB
ATRIAL RATE: 60 BPM
P AXIS: 62 DEGREES
PR INTERVAL: 152 MS
Q ONSET: 251 MS
QRS COUNT: 9 BEATS
QRS DURATION: 87 MS
QT INTERVAL: 438 MS
QTC CALCULATION(BAZETT): 438 MS
QTC FREDERICIA: 438 MS
R AXIS: 26 DEGREES
T AXIS: 29 DEGREES
T OFFSET: 470 MS
VENTRICULAR RATE: 60 BPM

## 2024-03-12 NOTE — PROGRESS NOTES
Post-Operative Office Visit  Julissa Chaudhari presents for her postoperative visit.    3/2/2024, the patient the following procedure urgently after being seen in the emergency department:   Incision and Drainage Anus/Rectum  97507 - [place] I&D ISCHIORECTAL&/PERIRECTAL ABSCESS SPX     Rectal exam under anesthesia     Diagnostic anoscopy    She was discharged to home postoperatively on tramadol and Augmentin, with the drain in place.  Please see the operative note for details.    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 Narendra Ruiz from the [facility] in a two-step procedure.     Since discharge, the patient has done quite well.  She has irritation from the drain.  She did have pain for the first 2 to 3 days relieved by tramadol.  She has had no fever chills or sweats.  She is eating well with normal bowel habits.  She finished the Augmentin prescription.  She returned to work on 3/11/2024, 2 days ago.    The patient is single.  She lives in [place].  Her friend, Yanely Christianson, is a former patient of mine.  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    Vitals  There were no vitals taken for this visit.     Exam    Hafsa, my medical assistant, chaperoned and assisted  The patient was examined in the prone jackknife position on the proctoscopy table    Perianal exam:  6 cm left anterior to the anal verge, 2 cm from the end of her previous scar, there is a Malecot drain; there is no perianal erythema induration or  tenderness; sutures and drain removed without difficulty; track extends 6 cm; dry gauze dressing ABD pad placed      Assessment and Plan    Julissa has done very well postoperatively.    Recommendations:    ABD pad daily and as needed to collect drainage; may discontinue when drainage stops    Sitz baths as needed    Keep perianal region clean and dry especially after bowel movement; use soap and water or disposable flushable wipes    Follow-up with me in 2 weeks for recheck

## 2024-03-13 ENCOUNTER — OFFICE VISIT (OUTPATIENT)
Dept: SURGERY | Facility: CLINIC | Age: 54
End: 2024-03-13
Payer: COMMERCIAL

## 2024-03-13 VITALS
BODY MASS INDEX: 29.4 KG/M2 | SYSTOLIC BLOOD PRESSURE: 119 MMHG | RESPIRATION RATE: 18 BRPM | DIASTOLIC BLOOD PRESSURE: 78 MMHG | WEIGHT: 194 LBS | OXYGEN SATURATION: 96 % | HEART RATE: 79 BPM | HEIGHT: 68 IN | TEMPERATURE: 97.8 F

## 2024-03-13 DIAGNOSIS — K61.39 ISCHIORECTAL ABSCESS: Primary | ICD-10-CM

## 2024-03-13 PROCEDURE — 3078F DIAST BP <80 MM HG: CPT | Performed by: SURGERY

## 2024-03-13 PROCEDURE — 99024 POSTOP FOLLOW-UP VISIT: CPT | Performed by: SURGERY

## 2024-03-13 PROCEDURE — 3074F SYST BP LT 130 MM HG: CPT | Performed by: SURGERY

## 2024-03-13 PROCEDURE — 1036F TOBACCO NON-USER: CPT | Performed by: SURGERY

## 2024-03-13 RX ORDER — MUPIROCIN 20 MG/G
OINTMENT TOPICAL
COMMUNITY
Start: 2022-09-16

## 2024-03-13 RX ORDER — DICLOFENAC SODIUM 10 MG/G
GEL TOPICAL 4 TIMES DAILY
COMMUNITY
Start: 2023-02-20

## 2024-03-13 RX ORDER — POLYETHYLENE GLYCOL 3350, SODIUM SULFATE ANHYDROUS, SODIUM BICARBONATE, SODIUM CHLORIDE, POTASSIUM CHLORIDE 236; 22.74; 6.74; 5.86; 2.97 G/4L; G/4L; G/4L; G/4L; G/4L
POWDER, FOR SOLUTION ORAL
COMMUNITY
Start: 2023-05-31

## 2024-03-13 ASSESSMENT — PAIN SCALES - GENERAL: PAINLEVEL: 3

## 2024-03-26 PROBLEM — Z86.010 HISTORY OF COLONIC POLYPS: Status: ACTIVE | Noted: 2023-07-20

## 2024-03-26 PROBLEM — Z86.0100 HISTORY OF COLONIC POLYPS: Status: ACTIVE | Noted: 2023-07-20

## 2024-03-26 PROBLEM — M25.541 PAIN INVOLVING JOINT OF FINGER OF RIGHT HAND: Status: RESOLVED | Noted: 2023-09-20 | Resolved: 2024-03-26

## 2024-03-26 PROBLEM — E11.9 DIABETES MELLITUS (MULTI): Status: RESOLVED | Noted: 2024-03-02 | Resolved: 2024-03-26

## 2024-03-26 PROBLEM — M19.90 OSTEOARTHRITIS: Status: ACTIVE | Noted: 2023-03-27

## 2024-03-26 PROBLEM — R58 HEMORRHAGE: Status: ACTIVE | Noted: 2024-03-26

## 2024-03-27 ENCOUNTER — OFFICE VISIT (OUTPATIENT)
Dept: SURGERY | Facility: CLINIC | Age: 54
End: 2024-03-27
Payer: COMMERCIAL

## 2024-03-27 VITALS
TEMPERATURE: 97.7 F | RESPIRATION RATE: 16 BRPM | SYSTOLIC BLOOD PRESSURE: 124 MMHG | DIASTOLIC BLOOD PRESSURE: 74 MMHG | BODY MASS INDEX: 29.86 KG/M2 | HEIGHT: 68 IN | WEIGHT: 197 LBS | HEART RATE: 64 BPM | OXYGEN SATURATION: 95 %

## 2024-03-27 DIAGNOSIS — K61.39 ISCHIORECTAL ABSCESS: Primary | ICD-10-CM

## 2024-03-27 DIAGNOSIS — Z98.890 STATUS POST INCISION AND DRAINAGE: ICD-10-CM

## 2024-03-27 PROCEDURE — 1036F TOBACCO NON-USER: CPT | Performed by: SURGERY

## 2024-03-27 PROCEDURE — 99024 POSTOP FOLLOW-UP VISIT: CPT | Performed by: SURGERY

## 2024-03-27 ASSESSMENT — PAIN SCALES - GENERAL: PAINLEVEL: 1

## 2024-03-27 NOTE — PROGRESS NOTES
Second Post-Operative Office Visit    Patient feels much better.  No pain.  Very minimal drainage.  She is wearing 1 pad a day with only drops of discharge.  She is back to full activity.    3/13/24:  Julissa Chaudhari presents for her postoperative visit.     3/2/2024, the patient the following procedure urgently after being seen in the emergency department:   Incision and Drainage Anus/Rectum  01856 - [place] I&D ISCHIORECTAL&/PERIRECTAL ABSCESS SPX     Rectal exam under anesthesia     Diagnostic anoscopy     She was discharged to home postoperatively on tramadol and Augmentin, with the drain in place.  Please see the operative note for details.     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 Narendra Ruiz from the [facility] in a two-step procedure.      Since discharge, the patient has done quite well.  She has irritation from the drain.  She did have pain for the first 2 to 3 days relieved by tramadol.  She has had no fever chills or sweats.  She is eating well with normal bowel habits.  She finished the Augmentin prescription.  She returned to work on 3/11/2024, 2 days ago.     The patient is single.  She lives in [place].  Her friend, Yanely Christianson, is a former patient of mine.  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     Vitals  There were no vitals taken for this visit.      Exam     Hafsa, my medical assistant, chaperoned and assisted  The patient was examined in the prone jackknife position on the  proctoscopy table     Perianal exam:  6 cm left anterior to the anal verge, 2 cm from the end of her previous scar, the drain site is dry and measures about 12 x 6 mm with some dry eschar; no drainage can be expressed; there is no erythema; the previous scar remains well-healed; digital rectal exam revealed no fullness or mass left laterally      Assessment and Plan     Julissa continues to do very well postoperatively.     Recommendations:     No restrictions    Pad only as needed to collect drainage; may discontinue pad and leave open to air when dry     follow-up with me in 4 weeks for recheck

## 2024-04-24 ENCOUNTER — OFFICE VISIT (OUTPATIENT)
Dept: SURGERY | Facility: CLINIC | Age: 54
End: 2024-04-24
Payer: COMMERCIAL

## 2024-04-24 VITALS
HEIGHT: 68 IN | SYSTOLIC BLOOD PRESSURE: 129 MMHG | RESPIRATION RATE: 17 BRPM | DIASTOLIC BLOOD PRESSURE: 78 MMHG | WEIGHT: 198.6 LBS | TEMPERATURE: 98 F | HEART RATE: 93 BPM | BODY MASS INDEX: 30.1 KG/M2 | OXYGEN SATURATION: 95 %

## 2024-04-24 DIAGNOSIS — K61.39 ISCHIORECTAL ABSCESS: Primary | ICD-10-CM

## 2024-04-24 PROCEDURE — 99024 POSTOP FOLLOW-UP VISIT: CPT | Performed by: SURGERY

## 2024-04-24 PROCEDURE — 1036F TOBACCO NON-USER: CPT | Performed by: SURGERY

## 2024-04-24 ASSESSMENT — PAIN SCALES - GENERAL: PAINLEVEL: 0-NO PAIN

## 2024-04-24 NOTE — PROGRESS NOTES
Third Post-Operative Office Visit     Without complaints.  She had no issues while vacationing in [place].  She thinks all of her symptoms have improved.  She has had no pain swelling or drainage.  The hardness along the old scar has softened up.    3/27/24:   Patient feels much better.  No pain.  Very minimal drainage.  She is wearing 1 pad a day with only drops of discharge.  She is back to full activity.     3/13/24:  Julissa Chaudhari presents for her postoperative visit.     3/2/2024, the patient the following procedure urgently after being seen in the emergency department:   Incision and Drainage Anus/Rectum  88001 - [place] I&D ISCHIORECTAL&/PERIRECTAL ABSCESS SPX     Rectal exam under anesthesia     Diagnostic anoscopy     She was discharged to home postoperatively on tramadol and Augmentin, with the drain in place.  Please see the operative note for details.     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 Narendra Ruiz from the [facility] in a two-step procedure.      Since discharge, the patient has done quite well.  She has irritation from the drain.  She did have pain for the first 2 to 3 days relieved by tramadol.  She has had no fever chills or sweats.  She is eating well with normal bowel habits.  She finished the Augmentin prescription.  She returned to work on 3/11/2024, 2 days ago.     The patient is single.  She lives in [place].  Her friend, Yanely Christianson, is a former patient of mine.  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     Vitals  There were no vitals taken for this visit.      Exam     Hafsa, my medical assistant, chaperoned and assisted  The patient was examined in the prone jackknife position on the proctoscopy table     Perianal exam:  6 cm left anterior to the anal verge, 2 cm from the end of her previous scar, the drain site completely healed and epithelialized.  The original scar is softer and less indurated.  Digital rectal exam revealed no fullness or mass left laterally and was essentially normal.     Assessment and Plan     Julissa continues to do very well postoperatively.  The incision and drainage site is completely healed and epithelialized.     Recommendations:     No restrictions     Watch for recurrent symptoms including spotting on the underwear, swelling or pain -return if these occur    Practice good colon health:    a.  Avoid constipation and straining with bowel movements  b.  Stay well-hydrated - drink at least six, 8 ounce glasses of fluid daily  c.  High fiber diet  d.  Add supplemental fiber to your diet daily;  may use granulated formula such as Metamucil or Citrucel daily;  I recommend Fiber Well gummies 2 daily which will provide 5 grams of supplemental fiber daily (these are easy to use, and can be purchased over-the-counter at [employer], or other pharmacies)  e.  Stool softener only as needed - may use Colace or similar brand, 100 mg by mouth twice a day  - may be purchased over-the-counter;  limit use

## 2024-06-25 ENCOUNTER — OFFICE VISIT (OUTPATIENT)
Dept: ORTHOPEDIC SURGERY | Facility: CLINIC | Age: 54
End: 2024-06-25
Payer: COMMERCIAL

## 2024-06-25 ENCOUNTER — HOSPITAL ENCOUNTER (OUTPATIENT)
Dept: RADIOLOGY | Facility: CLINIC | Age: 54
Discharge: HOME | End: 2024-06-25
Payer: COMMERCIAL

## 2024-06-25 DIAGNOSIS — M54.50 LOW BACK PAIN, UNSPECIFIED BACK PAIN LATERALITY, UNSPECIFIED CHRONICITY, UNSPECIFIED WHETHER SCIATICA PRESENT: ICD-10-CM

## 2024-06-25 DIAGNOSIS — M54.50 LOW BACK PAIN, UNSPECIFIED BACK PAIN LATERALITY, UNSPECIFIED CHRONICITY, UNSPECIFIED WHETHER SCIATICA PRESENT: Primary | ICD-10-CM

## 2024-06-25 DIAGNOSIS — M54.50 LUMBOSACRAL PAIN: ICD-10-CM

## 2024-06-25 PROCEDURE — 72120 X-RAY BEND ONLY L-S SPINE: CPT

## 2024-06-25 PROCEDURE — 99214 OFFICE O/P EST MOD 30 MIN: CPT | Performed by: ORTHOPAEDIC SURGERY

## 2024-06-25 PROCEDURE — 72110 X-RAY EXAM L-2 SPINE 4/>VWS: CPT | Performed by: ORTHOPAEDIC SURGERY

## 2024-06-25 PROCEDURE — 99213 OFFICE O/P EST LOW 20 MIN: CPT | Performed by: ORTHOPAEDIC SURGERY

## 2024-06-25 NOTE — PROGRESS NOTES
Julissa Chaudhari is a 53 y.o. female who presents for Follow-up of the Lower Back (L4-5 Open TLIF 9/21/22, almost 2 years out/Having left leg pain/X-rays today).    HPI:  53-year-old female here for follow-up low back.  She is almost 2 years out from an open L4-5 TLIF.  Having left leg pain.  She denies any fever chills nausea vomiting night sweats.  She has no bowel or bladder complaints.    Physical exam:  Well-nourished, well kept.No lymphangitis or lymphadenopathy in the examined extremities.  Gait normal.  Can stand on heels and toes.   Examination of the back shows mild tenderness in the paraspinous musculature.  There is no significant decreased range of motion in all directions due to guarding/muscle spasms and pain at extremes.  There is good strength and no instability.  Examination of the lower extremities reveals no point tenderness, swelling, or deformity.  Range of motion of the hips, knees, and ankles are full without crepitance, instability, or exacerbation of pain.  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  No redness, abrasions, or lesions on extremities  Gross sensation intact in the extremities.  Clonus negative.  Affect normal.  Alert and oriented ×3.  Coordination normal.     Imaging studies:  AP lateral flexion-extension plain films of the lumbar spine were ordered and reviewed today.    Assessment:  53-year-old female is almost 2 years out from an open L4-5 TLIF.  She was last seen by Dr. Winston in October 2023 for her 1 year follow-up, she was doing good at that visit.  She started to have a little bit of pain that returned after the first of the year in 2024 in the low back and down the leg.  Eventually in March it got bad enough to where she went to the emergency department, and was discovered that she had an anal abscess that needed to be surgically treated immediately.  After that surgery,  her pain subsided until just a short time ago, and now she is having left buttock and left hamstring pain nothing really going below the knee.  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  She has been doing some home therapy stretching exercises but she has not done any in person therapy for this recurrence of left leg pain.    We have ordered and reviewed test x-rays today.  Emergency department notes from May 2, 2024 were reviewed.  It does talk about her pain.  This is a patient with an exacerbation of a chronic problem that could pose potential bodily harm.    For complete plan and/or surgical details, please refer to Dr. Winston's portion of this split dictation.    -Uzair Forman PA-C    In a face-to-face encounter, I performed a history and physical examination, discussed pertinent diagnostic studies if indicated, and discussed diagnosis and management strategies with both the patient and the midlevel provider.  I reviewed the midlevel's note and agree with the documented findings and plan of care.    Left lumbosacral buttock hamstring pain.  She had a similar episode to this back in March and ended up being some sort of abscess that need to be operated on.  The symptoms feel similar and she has an appointment on Monday to see that physician.  She also has a colonoscopy tomorrow.  Her x-rays of her lumbar spine where we did an open laminectomy fusion on her look good.  It is unclear to me if this is a back issue or not.  Will get a get her into some physical therapy which she can start.  She is also to see her general surgeon who took care of that abscess and see if that is the problem.  If her workup from general surgery is negative and physical therapy is not helping she will come back and see me and we will get an MRI of her lumbar spine.  This is a new acute problem of uncertain prognosis.  We have ordered and reviewed x-rays.  We reviewed the emergency room notes from  May.    Zay Winston MD  Orthopedic surgery

## 2024-07-01 ENCOUNTER — APPOINTMENT (OUTPATIENT)
Dept: SURGERY | Facility: CLINIC | Age: 54
End: 2024-07-01
Payer: COMMERCIAL

## 2024-07-05 NOTE — PROGRESS NOTES
History Of Present Illness :  Julissa Chaudhari is a 53 y.o. female who presents for an office visit.  She was last seen by me on 4/24/2024 for her third postoperative visit.    Julissa returns as she has had some recurrent left buttock pain similar to the discomfort she had prior to her incision and drainage in early March.  She describes this as a dull aching sensation.  She has had no spotting or drainage in the perianal region.  She has had no change in her bowel habits.  She would like reevaluated precautionary.    The patient also has chronic low back pain and issues, and is not sure if this pain is related to her back.  She did see her spine physician recently.  Physical therapy has been ordered, with possible MRI if physical therapy is not successful.    Patient also had a colonoscopy on 6/27/2024 at [facility] and that note was reviewed.  2 hyperplastic polyps were noted on pathology.      4/24/2024:  Without complaints.  She had no issues while vacationing in [place].  She thinks all of her symptoms have improved.  She has had no pain swelling or drainage.  The hardness along the old scar has softened up.     3/27/24:   Patient feels much better.  No pain.  Very minimal drainage.  She is wearing 1 pad a day with only drops of discharge.  She is back to full activity.     3/13/24:  Julissa Chaudhari presents for her postoperative visit.     On 3/2/2024, the patient underwent the following procedure urgently after being seen in the emergency department:     Incision and Drainage Anus/Rectum  51072 - IN I&D ISCHIORECTAL&/PERIRECTAL ABSCESS SPX     Rectal exam under anesthesia     Diagnostic anoscopy     She was discharged to home postoperatively on tramadol and Augmentin, with the drain in place.  Please see the operative note for details.     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 Narendra Ruiz from the [facility] in a two-step procedure.      Since discharge, the patient has done quite well.  She has irritation from the drain.  She did have pain for the first 2 to 3 days relieved by tramadol.  She has had no fever chills or sweats.  She is eating well with normal bowel habits.  She finished the Augmentin prescription.  She returned to work on 3/11/2024, 2 days ago.     The patient is single.  She lives in [place].  Her friend, Yanely Christianson, is a former patient of mine.  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    Past Medical History   No past medical history on file.     Surgical History    Past Surgical History:   Procedure Laterality Date    LUMBAR FUSION          Allergies   No Known Allergies     Home Meds  Current Outpatient Medications   Medication Instructions    celecoxib (CeleBREX) 200 mg capsule 1 capsule, oral, Daily before breakfast    citalopram (CELEXA) 20 mg, oral, Daily, Every afternoon    diclofenac sodium (Voltaren) 1 % gel Topical, 4 times daily    DOCOSAHEXAENOIC ACID ORAL oral, Daily RT    Golytely 236-22.74-6.74 -5.86 gram solution MIX AND DRINK AS DIRECTED    lifitegrast (Xiidra) 5 % dropperette ophthalmic (eye), Take per directed    mupirocin (Bactroban) 2 % ointment nasal        Family History    No family history on file.     Social History  Social History     Tobacco Use    Smoking status: Never    Smokeless tobacco: Never   Substance Use Topics    Alcohol use: Yes     Comment: occasional    Drug use: Yes     Types: Marijuana     Comment: medical        Review Of Systems     Review of Systems    General: Not Present- Obesity, Cancer, HIV, MRSA, Recent Cold/Flu, Tired During the Day and VRE.  HEENT: Not Present- Migraine, Cataracts, Glaucoma, Macular Degeneration and Retinal Detachment.  Respiratory: Not Present- Asthma, Chronic Cough, Difficulty Breathing on Exertion, Difficulty Breathing at Rest, Emphysema, Frequent Bronchitis, Home CPAP/BiPAP, Home Oxygen, Pulmonary Embolus, Pneumonia/TB, Sleep Apnea and Snoring.  Cardiovascular: Not Present- Chest Pain, Congestive Heart Failure, Heart Attack, Coronary Artery Disease, Heart Stent, High Cholesterol/Lipids, Hypertension, Internal Defibrillator, Irregular Heart Beat, Mitral Valve Prolapse, Murmur, Pacemaker and Peripheral Vascular Disease.  Gastrointestinal: Not Present- Heartburn, Hepatitis, Hiatal Hernia, Jaundice, Reflux, Stomach Ulcer and IBS.  Female Genitourinary: Not Present- Kidney Failure, Kidney Stones, Dialysis and Urinary Tract Infection.  Musculoskeletal: Not Present- Arthritis, Back Pain and Fibromyalgia.  Neurological: Not Present- Headaches, Numbness, Tingling, Seizures, Stroke,  Shunt and Weakness.  Psychiatric: Not Present- Anxiety, Bipolar and Panic Attacks.  Endocrine: Not Present- Diabetes, Hyperthyroidism, Hypothyroidism and Low Blood Sugar.  Hematology: Not Present- Abnormal Bleeding, Anemia and Blood Clots.    Vitals  There were no vitals taken for this visit.     Physical Exam     Hafsa, my medical assistant, chaperoned and assisted  The patient was examined in the prone jackknife position on the proctoscopy table     Perianal exam:    5cm left anterior to the anal verge, 1 cm from the end of her previous 4 cm radial scar, the drain site remains completely healed and epithelialized.  The original scar is unchanged.  Digital rectal exam revealed no fullness or mass left laterally and was essentially normal.  Overall there is no evidence of inflammation or infection or abscess.    Assessment/Plan   Julissa has  left buttock pain of uncertain etiology, likely spine or musculoskeletal tomorrow etiology.  Her anal rectal examination remains satisfactory with no evidence of inflammation infection or new lesions.    Recommendations:      No restrictions from my standpoint    Continue to practice good colon health as previously described    Follow-up as needed for any new or recurrent symptoms

## 2024-07-08 ENCOUNTER — APPOINTMENT (OUTPATIENT)
Dept: SURGERY | Facility: CLINIC | Age: 54
End: 2024-07-08
Payer: COMMERCIAL

## 2024-07-08 VITALS
HEART RATE: 66 BPM | HEIGHT: 68 IN | DIASTOLIC BLOOD PRESSURE: 72 MMHG | SYSTOLIC BLOOD PRESSURE: 122 MMHG | TEMPERATURE: 98 F | WEIGHT: 198 LBS | OXYGEN SATURATION: 98 % | RESPIRATION RATE: 16 BRPM | BODY MASS INDEX: 30.01 KG/M2

## 2024-07-08 DIAGNOSIS — Z98.890 STATUS POST INCISION AND DRAINAGE: ICD-10-CM

## 2024-07-08 DIAGNOSIS — K61.39 ISCHIORECTAL ABSCESS: Primary | ICD-10-CM

## 2024-07-08 DIAGNOSIS — M79.18 LEFT BUTTOCK PAIN: ICD-10-CM

## 2024-07-08 PROCEDURE — 99213 OFFICE O/P EST LOW 20 MIN: CPT | Performed by: SURGERY

## 2024-07-08 PROCEDURE — 1036F TOBACCO NON-USER: CPT | Performed by: SURGERY

## 2024-07-08 ASSESSMENT — PAIN SCALES - GENERAL: PAINLEVEL: 2

## 2024-09-09 ENCOUNTER — HOSPITAL ENCOUNTER (EMERGENCY)
Facility: HOSPITAL | Age: 54
Discharge: HOME | End: 2024-09-10
Payer: COMMERCIAL

## 2024-09-09 VITALS
SYSTOLIC BLOOD PRESSURE: 150 MMHG | HEIGHT: 71 IN | HEART RATE: 99 BPM | RESPIRATION RATE: 18 BRPM | WEIGHT: 195 LBS | DIASTOLIC BLOOD PRESSURE: 90 MMHG | TEMPERATURE: 99.3 F | OXYGEN SATURATION: 100 % | BODY MASS INDEX: 27.3 KG/M2

## 2024-09-09 PROCEDURE — 4500999001 HC ED NO CHARGE

## 2024-09-09 ASSESSMENT — COLUMBIA-SUICIDE SEVERITY RATING SCALE - C-SSRS
6. HAVE YOU EVER DONE ANYTHING, STARTED TO DO ANYTHING, OR PREPARED TO DO ANYTHING TO END YOUR LIFE?: NO
1. IN THE PAST MONTH, HAVE YOU WISHED YOU WERE DEAD OR WISHED YOU COULD GO TO SLEEP AND NOT WAKE UP?: NO
2. HAVE YOU ACTUALLY HAD ANY THOUGHTS OF KILLING YOURSELF?: NO

## 2024-09-09 NOTE — PROGRESS NOTES
Established Patient Visit    The patient was last seen by me on 7/8/2024, 2 months ago.  Please see that note for details.  On Saturday, 9/7/2024, the patient developed some minor discomfort at the site of her previous perirectal abscess, left anteriorly.  This progressed to the point that last evening she had significant swelling and pain.  She has no drainage.  She had a low-grade fever.  She did go to the emergency department [facility] last night but was unable to be seen as the ER was very crowded and she had already waited for 4 hours.  She is seeing me today.  No other symptoms.    7/8/2024  Julissa Chaudhari is a 53 y.o. female who presents for an office visit.  She was last seen by me on 4/24/2024 for her third postoperative visit.     Julissa returns as she has had some recurrent left buttock pain similar to the discomfort she had prior to her incision and drainage in early March.  She describes this as a dull aching sensation.  She has had no spotting or drainage in the perianal region.  She has had no change in her bowel habits.  She would like reevaluated precautionary.     The patient also has chronic low back pain and issues, and is not sure if this pain is related to her back.  She did see her spine physician recently.  Physical therapy has been ordered, with possible MRI if physical therapy is not successful.     Patient also had a colonoscopy on 6/27/2024 at [facility] and that note was reviewed.  2 hyperplastic polyps were noted on pathology.        4/24/2024:  Without complaints.  She had no issues while vacationing in [place].  She thinks all of her symptoms have improved.  She has had no pain swelling or drainage.  The hardness along the old scar has softened up.     3/27/24:   Patient feels much better.  No pain.  Very minimal drainage.  She is wearing 1 pad a day with only drops of discharge.  She is back to full activity.     3/13/24:  Julissa Chaudhari presents for her postoperative visit.     On  3/2/2024, the patient underwent the following procedure urgently after being seen in the emergency department:      Incision and Drainage Anus/Rectum  02866 - [place] I&D ISCHIORECTAL&/PERIRECTAL ABSCESS SPX     Rectal exam under anesthesia     Diagnostic anoscopy     She was discharged to home postoperatively on tramadol and Augmentin, with the drain in place.  Please see the operative note for details.     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 Narendra Ruiz from the [facility] in a two-step procedure.      Since discharge, the patient has done quite well.  She has irritation from the drain.  She did have pain for the first 2 to 3 days relieved by tramadol.  She has had no fever chills or sweats.  She is eating well with normal bowel habits.  She finished the Augmentin prescription.  She returned to work on 3/11/2024, 2 days ago.     The patient is single.  She lives in [place].  Her friend, Yanely Christianson, is a former patient of mine.  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     Past Medical History   Medical History   No past medical history on file.         Surgical History    Surgical History         Past Surgical History:   Procedure Laterality Date    LUMBAR FUSION                Allergies   RX Allergies   No Known Allergies         Home Meds       Current Outpatient Medications   Medication Instructions    celecoxib (CeleBREX) 200 mg capsule 1 capsule, oral, Daily before breakfast    citalopram  (CELEXA) 20 mg, oral, Daily, Every afternoon    diclofenac sodium (Voltaren) 1 % gel Topical, 4 times daily    DOCOSAHEXAENOIC ACID ORAL oral, Daily RT    Golytely 236-22.74-6.74 -5.86 gram solution MIX AND DRINK AS DIRECTED    lifitegrast (Xiidra) 5 % dropperette ophthalmic (eye), Take per directed    mupirocin (Bactroban) 2 % ointment nasal         Family History    Family History   No family history on file.         Social History  Social History   Social History            Tobacco Use    Smoking status: Never    Smokeless tobacco: Never   Substance Use Topics    Alcohol use: Yes       Comment: occasional    Drug use: Yes       Types: Marijuana       Comment: medical            Review Of Systems    Review of Systems     General: Not Present- Obesity, Cancer, HIV, MRSA, Recent Cold/Flu, Tired During the Day and VRE.  HEENT: Not Present- Migraine, Cataracts, Glaucoma, Macular Degeneration and Retinal Detachment.  Respiratory: Not Present- Asthma, Chronic Cough, Difficulty Breathing on Exertion, Difficulty Breathing at Rest, Emphysema, Frequent Bronchitis, Home CPAP/BiPAP, Home Oxygen, Pulmonary Embolus, Pneumonia/TB, Sleep Apnea and Snoring.  Cardiovascular: Not Present- Chest Pain, Congestive Heart Failure, Heart Attack, Coronary Artery Disease, Heart Stent, High Cholesterol/Lipids, Hypertension, Internal Defibrillator, Irregular Heart Beat, Mitral Valve Prolapse, Murmur, Pacemaker and Peripheral Vascular Disease.  Gastrointestinal: Not Present- Heartburn, Hepatitis, Hiatal Hernia, Jaundice, Reflux, Stomach Ulcer and IBS.  Female Genitourinary: Not Present- Kidney Failure, Kidney Stones, Dialysis and Urinary Tract Infection.  Musculoskeletal: Not Present- Arthritis, Back Pain and Fibromyalgia.  Neurological: Not Present- Headaches, Numbness, Tingling, Seizures, Stroke,  Shunt and Weakness.  Psychiatric: Not Present- Anxiety, Bipolar and Panic Attacks.  Endocrine: Not Present- Diabetes, Hyperthyroidism,  Hypothyroidism and Low Blood Sugar.  Hematology: Not Present- Abnormal Bleeding, Anemia and Blood Clots.     Vitals  There were no vitals taken for this visit.     No fever-97.8     Physical Exam      General Complete Physical Exam    The physical exam findings are as follows:    General Appearance - The patient appears quite uncomfortable.  She is laying on the table in the right lateral queues position.  She is unable to sit on her buttock.  Cooperative and Well groomed. Build & Nutrition - Well nourished.  Posture - Normal posture. Gait - Normal. Hydration - Well hydrated.    Integumentary  General Characteristics: Overall examination of the patient's skin reveals - no rashes, no suspicious lesions, no bruises and no evidence of scars. Color - normal coloration of skin. Skin Moisture - normal skin moisture. Temperature - normal  warmth is noted. Texture - normal skin texture.    Head and Neck  Head - normocephalic, atraumatic with no lesions or palpable masses.  Neck  Global Assessment - full range of motion. no bruit auscultated on the right, no bruit auscultated on the left, non-tender, no lymphadenopathy, no palpable mass on the right and no palpable mass on the left.  Trachea - midline.  Thyroid Gland Characteristics - no palpable nodules, normal position, symmetric and smooth.    Eyes  Sclera/Conjunctiva - Bilateral - Normal. Pupil - Bilateral - Accommodating, Direct reaction to light normal and Equal.    ENMT  Global Assessment  Upon examination of the ears, nose, mouth and throat - examination of the oral cavity reveals normal lips, teeth, gums and oral mucosa and hard and soft palates, tongue, tonsils and posterior pharynx are moist and symmetric without lesions.    Chest and Lung Exam  Inspection: Shape - Symmetric. Movements - Symmetrical. Accessory muscles - No use of accessory muscles in breathing.  Percussion:  Quality and Intensity: - Percussion normal.  Palpation: - Palpation  normal.  Auscultation:  Breath sounds: - Normal and equal bilaterally.  Adventitious sounds: - none bilaterally.    Cardiovascular  Inspection: Carotid artery - Bilateral - Inspection Normal.  Palpation/Percussion: Examination by palpation and percussion reveals - No Thrills.  Cardiac Borders - Normal.  Auscultation: Rhythm - Regular. Rate: Regular.  Heart Sounds - Normal heart sounds, S1 WNL and S2 WNL.  Murmurs & Other Heart Sounds: Auscultation of the heart reveals - No Murmurs or other extra heart sounds.    Abdomen  Inspection: Inspection of the abdomen reveals - No Hernias.  Palpation/Percussion: Palpation and Percussion of the abdomen reveal - Non Tender and No Palpable abdominal  masses.  Liver: - Normal.  Spleen: - Normal.  Auscultation: Auscultation of the abdomen reveals - Bowel sounds normal.  Surgical scars: none    Inguinal  No inguinal or femoral hernias or lymphadenopathy bilaterally.      Aleshia, my medical assistant, chaperoned and assisted  The patient was examined in the left lateral decubitus position on the proctoscopy table    Perianal exam:     5cm left anterior to the anal verge, 1 cm from the end of her previous 4 cm radial scar, the drain site remains completely healed and epithelialized.  The original scar is unchanged.  However, at this site, the patient now has a large area of fullness measuring about 8 x 4 cm which is tender.  She also has blanching erythema.  Digital rectal exam revealed tender fullness left laterally.      Peripheral Vascular  Lower Extremity: Inspection - Bilateral - No Varicose veins.  Palpation: Edema - Bilateral - No edema.    Musculoskeletal  Global Assessment  Right Upper Extremity - no deformities, masses or tenderness, no known fractures, normal strength and tone and  normal range of motion without pain. Left Upper Extremity - no deformities, masses or tenderness, no known fractures,  normal strength and tone and normal range of motion without pain. Right  Lower Extremity - no deformities, masses or  tenderness, no known fractures, normal strength and tone and normal range of motion without pain. Left Lower  Extremity - no deformities, masses or tenderness, no known fractures, normal strength and tone and normal range of  motion without pain.    Lymphatic  Head & Neck  General Head & Neck Lymphatics:  Bilateral: Description - Normal.  Axillary  General Axillary Region:  Bilateral: Description - Normal.  Femoral & Inguinal  Generalized Femoral & Inguinal Lymphatics:  Bilateral: Description - Normal.       Assessment/Plan     Julissa has evidence of a recurrent left anterior perirectal or ischiorectal abscess.      This was last drained emergently on 3/2/2024.  Please see the above for details.        Recommendations:       Will place the patient on Augmentin 875 mg p.o. twice daily    Patient should continue ibuprofen 600-800 mg with Tylenol 500-1000 mg 4 times a day with food    Will add oxycodone 5 mg dispense #20 for breakthrough pain    Patient will undergo outpatient incision and drainage of the left anterior perirectal abscess under general anesthesia at 0 7:30 AM tomorrow morning, 9/11/2024    Postoperative instructions will be given at that time    I will see the patient 1 week thereafter at 9/18/2024 for postoperative visit at my [facility] office    Patient's most recent EKG was on 3/2/2024 that was normal sinus rhythm and this does not need to be repeated    Will check CBC and differential and BMP today    Anorectal Surgery Consent: The procedure was explained to the patient in detail, including the risks, benefits and alternatives. The risks include, but are not limited to, infection, abscess, bleeding, hematoma, seroma, poor wound healing, persistent or recurrent symptoms, need for further surgery, fistula, key-hole defect and fecal incontinence.  The patient agreed with the plan and signed the electronic consent.

## 2024-09-09 NOTE — H&P (VIEW-ONLY)
Established Patient Visit    The patient was last seen by me on 7/8/2024, 2 months ago.  Please see that note for details.  On Saturday, 9/7/2024, the patient developed some minor discomfort at the site of her previous perirectal abscess, left anteriorly.  This progressed to the point that last evening she had significant swelling and pain.  She has no drainage.  She had a low-grade fever.  She did go to the emergency department [facility] last night but was unable to be seen as the ER was very crowded and she had already waited for 4 hours.  She is seeing me today.  No other symptoms.    7/8/2024  Julissa Chaudhari is a 53 y.o. female who presents for an office visit.  She was last seen by me on 4/24/2024 for her third postoperative visit.     Julissa returns as she has had some recurrent left buttock pain similar to the discomfort she had prior to her incision and drainage in early March.  She describes this as a dull aching sensation.  She has had no spotting or drainage in the perianal region.  She has had no change in her bowel habits.  She would like reevaluated precautionary.     The patient also has chronic low back pain and issues, and is not sure if this pain is related to her back.  She did see her spine physician recently.  Physical therapy has been ordered, with possible MRI if physical therapy is not successful.     Patient also had a colonoscopy on 6/27/2024 at [facility] and that note was reviewed.  2 hyperplastic polyps were noted on pathology.        4/24/2024:  Without complaints.  She had no issues while vacationing in [place].  She thinks all of her symptoms have improved.  She has had no pain swelling or drainage.  The hardness along the old scar has softened up.     3/27/24:   Patient feels much better.  No pain.  Very minimal drainage.  She is wearing 1 pad a day with only drops of discharge.  She is back to full activity.     3/13/24:  Julissa Chaudhari presents for her postoperative visit.     On  3/2/2024, the patient underwent the following procedure urgently after being seen in the emergency department:      Incision and Drainage Anus/Rectum  06813 - [place] I&D ISCHIORECTAL&/PERIRECTAL ABSCESS SPX     Rectal exam under anesthesia     Diagnostic anoscopy     She was discharged to home postoperatively on tramadol and Augmentin, with the drain in place.  Please see the operative note for details.     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 Narendra Ruiz from the [facility] in a two-step procedure.      Since discharge, the patient has done quite well.  She has irritation from the drain.  She did have pain for the first 2 to 3 days relieved by tramadol.  She has had no fever chills or sweats.  She is eating well with normal bowel habits.  She finished the Augmentin prescription.  She returned to work on 3/11/2024, 2 days ago.     The patient is single.  She lives in [place].  Her friend, Yanely Christianson, is a former patient of mine.  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     Past Medical History   Medical History   No past medical history on file.         Surgical History    Surgical History         Past Surgical History:   Procedure Laterality Date    LUMBAR FUSION                Allergies   RX Allergies   No Known Allergies         Home Meds       Current Outpatient Medications   Medication Instructions    celecoxib (CeleBREX) 200 mg capsule 1 capsule, oral, Daily before breakfast    citalopram  (CELEXA) 20 mg, oral, Daily, Every afternoon    diclofenac sodium (Voltaren) 1 % gel Topical, 4 times daily    DOCOSAHEXAENOIC ACID ORAL oral, Daily RT    Golytely 236-22.74-6.74 -5.86 gram solution MIX AND DRINK AS DIRECTED    lifitegrast (Xiidra) 5 % dropperette ophthalmic (eye), Take per directed    mupirocin (Bactroban) 2 % ointment nasal         Family History    Family History   No family history on file.         Social History  Social History   Social History            Tobacco Use    Smoking status: Never    Smokeless tobacco: Never   Substance Use Topics    Alcohol use: Yes       Comment: occasional    Drug use: Yes       Types: Marijuana       Comment: medical            Review Of Systems    Review of Systems     General: Not Present- Obesity, Cancer, HIV, MRSA, Recent Cold/Flu, Tired During the Day and VRE.  HEENT: Not Present- Migraine, Cataracts, Glaucoma, Macular Degeneration and Retinal Detachment.  Respiratory: Not Present- Asthma, Chronic Cough, Difficulty Breathing on Exertion, Difficulty Breathing at Rest, Emphysema, Frequent Bronchitis, Home CPAP/BiPAP, Home Oxygen, Pulmonary Embolus, Pneumonia/TB, Sleep Apnea and Snoring.  Cardiovascular: Not Present- Chest Pain, Congestive Heart Failure, Heart Attack, Coronary Artery Disease, Heart Stent, High Cholesterol/Lipids, Hypertension, Internal Defibrillator, Irregular Heart Beat, Mitral Valve Prolapse, Murmur, Pacemaker and Peripheral Vascular Disease.  Gastrointestinal: Not Present- Heartburn, Hepatitis, Hiatal Hernia, Jaundice, Reflux, Stomach Ulcer and IBS.  Female Genitourinary: Not Present- Kidney Failure, Kidney Stones, Dialysis and Urinary Tract Infection.  Musculoskeletal: Not Present- Arthritis, Back Pain and Fibromyalgia.  Neurological: Not Present- Headaches, Numbness, Tingling, Seizures, Stroke,  Shunt and Weakness.  Psychiatric: Not Present- Anxiety, Bipolar and Panic Attacks.  Endocrine: Not Present- Diabetes, Hyperthyroidism,  Hypothyroidism and Low Blood Sugar.  Hematology: Not Present- Abnormal Bleeding, Anemia and Blood Clots.     Vitals  There were no vitals taken for this visit.     No fever-97.8     Physical Exam      General Complete Physical Exam    The physical exam findings are as follows:    General Appearance - The patient appears quite uncomfortable.  She is laying on the table in the right lateral queues position.  She is unable to sit on her buttock.  Cooperative and Well groomed. Build & Nutrition - Well nourished.  Posture - Normal posture. Gait - Normal. Hydration - Well hydrated.    Integumentary  General Characteristics: Overall examination of the patient's skin reveals - no rashes, no suspicious lesions, no bruises and no evidence of scars. Color - normal coloration of skin. Skin Moisture - normal skin moisture. Temperature - normal  warmth is noted. Texture - normal skin texture.    Head and Neck  Head - normocephalic, atraumatic with no lesions or palpable masses.  Neck  Global Assessment - full range of motion. no bruit auscultated on the right, no bruit auscultated on the left, non-tender, no lymphadenopathy, no palpable mass on the right and no palpable mass on the left.  Trachea - midline.  Thyroid Gland Characteristics - no palpable nodules, normal position, symmetric and smooth.    Eyes  Sclera/Conjunctiva - Bilateral - Normal. Pupil - Bilateral - Accommodating, Direct reaction to light normal and Equal.    ENMT  Global Assessment  Upon examination of the ears, nose, mouth and throat - examination of the oral cavity reveals normal lips, teeth, gums and oral mucosa and hard and soft palates, tongue, tonsils and posterior pharynx are moist and symmetric without lesions.    Chest and Lung Exam  Inspection: Shape - Symmetric. Movements - Symmetrical. Accessory muscles - No use of accessory muscles in breathing.  Percussion:  Quality and Intensity: - Percussion normal.  Palpation: - Palpation  normal.  Auscultation:  Breath sounds: - Normal and equal bilaterally.  Adventitious sounds: - none bilaterally.    Cardiovascular  Inspection: Carotid artery - Bilateral - Inspection Normal.  Palpation/Percussion: Examination by palpation and percussion reveals - No Thrills.  Cardiac Borders - Normal.  Auscultation: Rhythm - Regular. Rate: Regular.  Heart Sounds - Normal heart sounds, S1 WNL and S2 WNL.  Murmurs & Other Heart Sounds: Auscultation of the heart reveals - No Murmurs or other extra heart sounds.    Abdomen  Inspection: Inspection of the abdomen reveals - No Hernias.  Palpation/Percussion: Palpation and Percussion of the abdomen reveal - Non Tender and No Palpable abdominal  masses.  Liver: - Normal.  Spleen: - Normal.  Auscultation: Auscultation of the abdomen reveals - Bowel sounds normal.  Surgical scars: none    Inguinal  No inguinal or femoral hernias or lymphadenopathy bilaterally.      Aleshia, my medical assistant, chaperoned and assisted  The patient was examined in the left lateral decubitus position on the proctoscopy table    Perianal exam:     5cm left anterior to the anal verge, 1 cm from the end of her previous 4 cm radial scar, the drain site remains completely healed and epithelialized.  The original scar is unchanged.  However, at this site, the patient now has a large area of fullness measuring about 8 x 4 cm which is tender.  She also has blanching erythema.  Digital rectal exam revealed tender fullness left laterally.      Peripheral Vascular  Lower Extremity: Inspection - Bilateral - No Varicose veins.  Palpation: Edema - Bilateral - No edema.    Musculoskeletal  Global Assessment  Right Upper Extremity - no deformities, masses or tenderness, no known fractures, normal strength and tone and  normal range of motion without pain. Left Upper Extremity - no deformities, masses or tenderness, no known fractures,  normal strength and tone and normal range of motion without pain. Right  Lower Extremity - no deformities, masses or  tenderness, no known fractures, normal strength and tone and normal range of motion without pain. Left Lower  Extremity - no deformities, masses or tenderness, no known fractures, normal strength and tone and normal range of  motion without pain.    Lymphatic  Head & Neck  General Head & Neck Lymphatics:  Bilateral: Description - Normal.  Axillary  General Axillary Region:  Bilateral: Description - Normal.  Femoral & Inguinal  Generalized Femoral & Inguinal Lymphatics:  Bilateral: Description - Normal.       Assessment/Plan     Julissa has evidence of a recurrent left anterior perirectal or ischiorectal abscess.      This was last drained emergently on 3/2/2024.  Please see the above for details.        Recommendations:       Will place the patient on Augmentin 875 mg p.o. twice daily    Patient should continue ibuprofen 600-800 mg with Tylenol 500-1000 mg 4 times a day with food    Will add oxycodone 5 mg dispense #20 for breakthrough pain    Patient will undergo outpatient incision and drainage of the left anterior perirectal abscess under general anesthesia at 0 7:30 AM tomorrow morning, 9/11/2024    Postoperative instructions will be given at that time    I will see the patient 1 week thereafter at 9/18/2024 for postoperative visit at my [facility] office    Patient's most recent EKG was on 3/2/2024 that was normal sinus rhythm and this does not need to be repeated    Will check CBC and differential and BMP today    Anorectal Surgery Consent: The procedure was explained to the patient in detail, including the risks, benefits and alternatives. The risks include, but are not limited to, infection, abscess, bleeding, hematoma, seroma, poor wound healing, persistent or recurrent symptoms, need for further surgery, fistula, key-hole defect and fecal incontinence.  The patient agreed with the plan and signed the electronic consent.

## 2024-09-10 ENCOUNTER — OFFICE VISIT (OUTPATIENT)
Dept: SURGERY | Facility: CLINIC | Age: 54
End: 2024-09-10
Payer: COMMERCIAL

## 2024-09-10 ENCOUNTER — LAB (OUTPATIENT)
Dept: LAB | Facility: LAB | Age: 54
End: 2024-09-10
Payer: COMMERCIAL

## 2024-09-10 ENCOUNTER — PREP FOR PROCEDURE (OUTPATIENT)
Dept: SURGERY | Facility: CLINIC | Age: 54
End: 2024-09-10

## 2024-09-10 VITALS
HEIGHT: 71 IN | DIASTOLIC BLOOD PRESSURE: 77 MMHG | OXYGEN SATURATION: 97 % | WEIGHT: 193 LBS | SYSTOLIC BLOOD PRESSURE: 113 MMHG | RESPIRATION RATE: 17 BRPM | HEART RATE: 92 BPM | TEMPERATURE: 97.9 F | BODY MASS INDEX: 27.02 KG/M2

## 2024-09-10 DIAGNOSIS — K61.39 ISCHIORECTAL ABSCESS: ICD-10-CM

## 2024-09-10 DIAGNOSIS — K61.39 ISCHIORECTAL ABSCESS: Primary | ICD-10-CM

## 2024-09-10 PROCEDURE — 99215 OFFICE O/P EST HI 40 MIN: CPT | Performed by: SURGERY

## 2024-09-10 PROCEDURE — 1036F TOBACCO NON-USER: CPT | Performed by: SURGERY

## 2024-09-10 PROCEDURE — 36415 COLL VENOUS BLD VENIPUNCTURE: CPT

## 2024-09-10 PROCEDURE — 80048 BASIC METABOLIC PNL TOTAL CA: CPT

## 2024-09-10 PROCEDURE — 3008F BODY MASS INDEX DOCD: CPT | Performed by: SURGERY

## 2024-09-10 PROCEDURE — 85025 COMPLETE CBC W/AUTO DIFF WBC: CPT

## 2024-09-10 RX ORDER — SODIUM CHLORIDE, SODIUM LACTATE, POTASSIUM CHLORIDE, CALCIUM CHLORIDE 600; 310; 30; 20 MG/100ML; MG/100ML; MG/100ML; MG/100ML
100 INJECTION, SOLUTION INTRAVENOUS CONTINUOUS
Status: CANCELLED | OUTPATIENT
Start: 2024-09-11

## 2024-09-10 RX ORDER — OXYCODONE HYDROCHLORIDE 5 MG/1
5 TABLET ORAL EVERY 6 HOURS PRN
Qty: 20 TABLET | Refills: 0 | Status: SHIPPED | OUTPATIENT
Start: 2024-09-10 | End: 2024-09-15

## 2024-09-10 RX ORDER — AMOXICILLIN AND CLAVULANATE POTASSIUM 875; 125 MG/1; MG/1
1 TABLET, FILM COATED ORAL 2 TIMES DAILY
Qty: 20 TABLET | Refills: 0 | Status: SHIPPED | OUTPATIENT
Start: 2024-09-10 | End: 2024-09-20

## 2024-09-10 RX ORDER — DICLOFENAC SODIUM 10 MG/G
GEL TOPICAL
COMMUNITY

## 2024-09-10 ASSESSMENT — PAIN SCALES - GENERAL: PAINLEVEL: 10-WORST PAIN EVER

## 2024-09-10 NOTE — PREPROCEDURE INSTRUCTIONS
Current Medications   Medication Instructions    amoxicillin-pot clavulanate (Augmentin) 875-125 mg tablet Take morning of surgery with sip of water, no other fluids    citalopram (CeleXA) 20 mg tablet Take morning of surgery with sip of water, no other fluids    lifitegrast (Xiidra) 5 % dropperette Ok to use morning of surgery    oxyCODONE (Roxicodone) 5 mg immediate release tablet Take morning of surgery with sip of water if needed for pain            NPO Instructions:    Do not eat or drink after midnight the night before your surgery/procedure.    Additional Instructions:     Day of Surgery: Arrive at 6:00 AM for 7:30 AM surgery    Enter through the main entrance of [facility], located at [address] [facility]. Proceed to registration, located in the back right hand corner. You will need your ID and insurance card for registration. Please ensure you have a responsible adult to drive you home.     Take a shower the morning of or night before your procedure. After you shower avoid lotions, powders, deodorants or anything applied to the skin. If you wear contacts or glasses, wear the glasses. If you do not have glasses, please bring a case for your contacts. You may wear hearing aids and dentures, bring a case for them or we will provide one. Make sure you wear something loose and comfortable. Keep in mind your surgery type and wear something that will accommodate incisions or bandages. Please remove all jewelry.   You may take medications discussed during phone call with a small sip of water.    For further questions Marietta DELFINA can be contacted at [phone] between 7AM-3PM.

## 2024-09-10 NOTE — ED TRIAGE NOTES
Pt states anal abscess since Saturday. Described as hard, not draining. Pt states hx of this with surgery to remove. Pt has appt with Dr. Pillai tomorrow but states pain is unbearable

## 2024-09-11 ENCOUNTER — ANESTHESIA (OUTPATIENT)
Dept: OPERATING ROOM | Facility: HOSPITAL | Age: 54
End: 2024-09-11
Payer: COMMERCIAL

## 2024-09-11 ENCOUNTER — HOSPITAL ENCOUNTER (OUTPATIENT)
Facility: HOSPITAL | Age: 54
Setting detail: OUTPATIENT SURGERY
Discharge: HOME | End: 2024-09-11
Attending: SURGERY | Admitting: SURGERY
Payer: COMMERCIAL

## 2024-09-11 ENCOUNTER — ANESTHESIA EVENT (OUTPATIENT)
Dept: OPERATING ROOM | Facility: HOSPITAL | Age: 54
End: 2024-09-11
Payer: COMMERCIAL

## 2024-09-11 VITALS
TEMPERATURE: 98.1 F | BODY MASS INDEX: 27.63 KG/M2 | HEART RATE: 73 BPM | SYSTOLIC BLOOD PRESSURE: 100 MMHG | RESPIRATION RATE: 18 BRPM | WEIGHT: 193 LBS | DIASTOLIC BLOOD PRESSURE: 56 MMHG | OXYGEN SATURATION: 99 % | HEIGHT: 70 IN

## 2024-09-11 DIAGNOSIS — K61.39 ISCHIORECTAL ABSCESS: Primary | ICD-10-CM

## 2024-09-11 LAB
ANION GAP SERPL CALC-SCNC: 17 MMOL/L (ref 10–20)
BASOPHILS # BLD AUTO: 0.1 X10*3/UL (ref 0–0.1)
BASOPHILS NFR BLD AUTO: 0.5 %
BUN SERPL-MCNC: 14 MG/DL (ref 6–23)
CALCIUM SERPL-MCNC: 9.3 MG/DL (ref 8.6–10.6)
CHLORIDE SERPL-SCNC: 102 MMOL/L (ref 98–107)
CO2 SERPL-SCNC: 19 MMOL/L (ref 21–32)
CREAT SERPL-MCNC: 0.77 MG/DL (ref 0.5–1.05)
EGFRCR SERPLBLD CKD-EPI 2021: >90 ML/MIN/1.73M*2
EOSINOPHIL # BLD AUTO: 0.22 X10*3/UL (ref 0–0.7)
EOSINOPHIL NFR BLD AUTO: 1.1 %
ERYTHROCYTE [DISTWIDTH] IN BLOOD BY AUTOMATED COUNT: 13.2 % (ref 11.5–14.5)
GLUCOSE SERPL-MCNC: 115 MG/DL (ref 74–99)
HCT VFR BLD AUTO: 39.7 % (ref 36–46)
HGB BLD-MCNC: 12.5 G/DL (ref 12–16)
IMM GRANULOCYTES # BLD AUTO: 0.11 X10*3/UL (ref 0–0.7)
IMM GRANULOCYTES NFR BLD AUTO: 0.6 % (ref 0–0.9)
LYMPHOCYTES # BLD AUTO: 2.06 X10*3/UL (ref 1.2–4.8)
LYMPHOCYTES NFR BLD AUTO: 10.4 %
MCH RBC QN AUTO: 30.5 PG (ref 26–34)
MCHC RBC AUTO-ENTMCNC: 31.5 G/DL (ref 32–36)
MCV RBC AUTO: 97 FL (ref 80–100)
MONOCYTES # BLD AUTO: 1.75 X10*3/UL (ref 0.1–1)
MONOCYTES NFR BLD AUTO: 8.8 %
NEUTROPHILS # BLD AUTO: 15.56 X10*3/UL (ref 1.2–7.7)
NEUTROPHILS NFR BLD AUTO: 78.6 %
NRBC BLD-RTO: 0 /100 WBCS (ref 0–0)
PLATELET # BLD AUTO: 408 X10*3/UL (ref 150–450)
POTASSIUM SERPL-SCNC: 4 MMOL/L (ref 3.5–5.3)
RBC # BLD AUTO: 4.1 X10*6/UL (ref 4–5.2)
SODIUM SERPL-SCNC: 134 MMOL/L (ref 136–145)
WBC # BLD AUTO: 19.8 X10*3/UL (ref 4.4–11.3)

## 2024-09-11 PROCEDURE — 2720000007 HC OR 272 NO HCPCS: Performed by: SURGERY

## 2024-09-11 PROCEDURE — 7100000002 HC RECOVERY ROOM TIME - EACH INCREMENTAL 1 MINUTE: Performed by: SURGERY

## 2024-09-11 PROCEDURE — 46040 I&D ISCHIORCT&/PERIRCT ABSC: CPT | Performed by: SURGERY

## 2024-09-11 PROCEDURE — 7100000009 HC PHASE TWO TIME - INITIAL BASE CHARGE: Performed by: SURGERY

## 2024-09-11 PROCEDURE — 2500000005 HC RX 250 GENERAL PHARMACY W/O HCPCS: Performed by: ANESTHESIOLOGIST ASSISTANT

## 2024-09-11 PROCEDURE — 3600000003 HC OR TIME - INITIAL BASE CHARGE - PROCEDURE LEVEL THREE: Performed by: SURGERY

## 2024-09-11 PROCEDURE — 7100000010 HC PHASE TWO TIME - EACH INCREMENTAL 1 MINUTE: Performed by: SURGERY

## 2024-09-11 PROCEDURE — 3600000008 HC OR TIME - EACH INCREMENTAL 1 MINUTE - PROCEDURE LEVEL THREE: Performed by: SURGERY

## 2024-09-11 PROCEDURE — 2500000004 HC RX 250 GENERAL PHARMACY W/ HCPCS (ALT 636 FOR OP/ED): Performed by: ANESTHESIOLOGIST ASSISTANT

## 2024-09-11 PROCEDURE — 2500000004 HC RX 250 GENERAL PHARMACY W/ HCPCS (ALT 636 FOR OP/ED): Performed by: ANESTHESIOLOGY

## 2024-09-11 PROCEDURE — 2500000004 HC RX 250 GENERAL PHARMACY W/ HCPCS (ALT 636 FOR OP/ED): Performed by: SURGERY

## 2024-09-11 PROCEDURE — 3700000001 HC GENERAL ANESTHESIA TIME - INITIAL BASE CHARGE: Performed by: SURGERY

## 2024-09-11 PROCEDURE — 7100000001 HC RECOVERY ROOM TIME - INITIAL BASE CHARGE: Performed by: SURGERY

## 2024-09-11 PROCEDURE — 87077 CULTURE AEROBIC IDENTIFY: CPT | Mod: PARLAB | Performed by: SURGERY

## 2024-09-11 PROCEDURE — 3700000002 HC GENERAL ANESTHESIA TIME - EACH INCREMENTAL 1 MINUTE: Performed by: SURGERY

## 2024-09-11 RX ORDER — LABETALOL HYDROCHLORIDE 5 MG/ML
5 INJECTION, SOLUTION INTRAVENOUS ONCE AS NEEDED
Status: DISCONTINUED | OUTPATIENT
Start: 2024-09-11 | End: 2024-09-11 | Stop reason: HOSPADM

## 2024-09-11 RX ORDER — ROCURONIUM BROMIDE 10 MG/ML
INJECTION, SOLUTION INTRAVENOUS AS NEEDED
Status: DISCONTINUED | OUTPATIENT
Start: 2024-09-11 | End: 2024-09-11

## 2024-09-11 RX ORDER — LIDOCAINE HYDROCHLORIDE 10 MG/ML
0.1 INJECTION INFILTRATION; PERINEURAL ONCE
Status: DISCONTINUED | OUTPATIENT
Start: 2024-09-11 | End: 2024-09-11 | Stop reason: HOSPADM

## 2024-09-11 RX ORDER — MEPERIDINE HYDROCHLORIDE 25 MG/ML
INJECTION INTRAMUSCULAR; INTRAVENOUS; SUBCUTANEOUS AS NEEDED
Status: DISCONTINUED | OUTPATIENT
Start: 2024-09-11 | End: 2024-09-11

## 2024-09-11 RX ORDER — MIDAZOLAM HYDROCHLORIDE 1 MG/ML
INJECTION, SOLUTION INTRAMUSCULAR; INTRAVENOUS AS NEEDED
Status: DISCONTINUED | OUTPATIENT
Start: 2024-09-11 | End: 2024-09-11

## 2024-09-11 RX ORDER — HYDRALAZINE HYDROCHLORIDE 20 MG/ML
5 INJECTION INTRAMUSCULAR; INTRAVENOUS EVERY 30 MIN PRN
Status: DISCONTINUED | OUTPATIENT
Start: 2024-09-11 | End: 2024-09-11 | Stop reason: HOSPADM

## 2024-09-11 RX ORDER — SODIUM CHLORIDE, SODIUM LACTATE, POTASSIUM CHLORIDE, CALCIUM CHLORIDE 600; 310; 30; 20 MG/100ML; MG/100ML; MG/100ML; MG/100ML
100 INJECTION, SOLUTION INTRAVENOUS CONTINUOUS
Status: DISCONTINUED | OUTPATIENT
Start: 2024-09-11 | End: 2024-09-11 | Stop reason: HOSPADM

## 2024-09-11 RX ORDER — ONDANSETRON HYDROCHLORIDE 2 MG/ML
INJECTION, SOLUTION INTRAVENOUS AS NEEDED
Status: DISCONTINUED | OUTPATIENT
Start: 2024-09-11 | End: 2024-09-11

## 2024-09-11 RX ORDER — TRAMADOL HYDROCHLORIDE 50 MG/1
50 TABLET ORAL EVERY 6 HOURS PRN
Qty: 20 TABLET | Refills: 0 | Status: SHIPPED | OUTPATIENT
Start: 2024-09-11 | End: 2024-09-16

## 2024-09-11 RX ORDER — ONDANSETRON HYDROCHLORIDE 2 MG/ML
4 INJECTION, SOLUTION INTRAVENOUS ONCE AS NEEDED
Status: DISCONTINUED | OUTPATIENT
Start: 2024-09-11 | End: 2024-09-11 | Stop reason: HOSPADM

## 2024-09-11 RX ORDER — ACETAMINOPHEN 325 MG/1
650 TABLET ORAL EVERY 4 HOURS PRN
Status: DISCONTINUED | OUTPATIENT
Start: 2024-09-11 | End: 2024-09-11 | Stop reason: HOSPADM

## 2024-09-11 RX ORDER — MEPERIDINE HYDROCHLORIDE 25 MG/ML
12.5 INJECTION INTRAMUSCULAR; INTRAVENOUS; SUBCUTANEOUS EVERY 10 MIN PRN
Status: DISCONTINUED | OUTPATIENT
Start: 2024-09-11 | End: 2024-09-11 | Stop reason: HOSPADM

## 2024-09-11 RX ORDER — ONDANSETRON 4 MG/1
4 TABLET, ORALLY DISINTEGRATING ORAL EVERY 8 HOURS PRN
Qty: 9 TABLET | Refills: 0 | Status: SHIPPED | OUTPATIENT
Start: 2024-09-11 | End: 2024-09-14

## 2024-09-11 RX ORDER — ALBUTEROL SULFATE 0.83 MG/ML
2.5 SOLUTION RESPIRATORY (INHALATION) ONCE AS NEEDED
Status: DISCONTINUED | OUTPATIENT
Start: 2024-09-11 | End: 2024-09-11 | Stop reason: HOSPADM

## 2024-09-11 RX ORDER — KETOROLAC TROMETHAMINE 30 MG/ML
INJECTION, SOLUTION INTRAMUSCULAR; INTRAVENOUS AS NEEDED
Status: DISCONTINUED | OUTPATIENT
Start: 2024-09-11 | End: 2024-09-11

## 2024-09-11 RX ORDER — MIDAZOLAM HYDROCHLORIDE 1 MG/ML
1 INJECTION, SOLUTION INTRAMUSCULAR; INTRAVENOUS ONCE AS NEEDED
Status: DISCONTINUED | OUTPATIENT
Start: 2024-09-11 | End: 2024-09-11 | Stop reason: HOSPADM

## 2024-09-11 RX ORDER — FENTANYL CITRATE 50 UG/ML
INJECTION, SOLUTION INTRAMUSCULAR; INTRAVENOUS AS NEEDED
Status: DISCONTINUED | OUTPATIENT
Start: 2024-09-11 | End: 2024-09-11

## 2024-09-11 RX ORDER — PROPOFOL 10 MG/ML
INJECTION, EMULSION INTRAVENOUS AS NEEDED
Status: DISCONTINUED | OUTPATIENT
Start: 2024-09-11 | End: 2024-09-11

## 2024-09-11 SDOH — HEALTH STABILITY: MENTAL HEALTH: CURRENT SMOKER: 0

## 2024-09-11 ASSESSMENT — PAIN SCALES - GENERAL
PAINLEVEL_OUTOF10: 3
PAIN_LEVEL: 0
PAINLEVEL_OUTOF10: 3
PAINLEVEL_OUTOF10: 3
PAINLEVEL_OUTOF10: 0 - NO PAIN
PAINLEVEL_OUTOF10: 0 - NO PAIN
PAINLEVEL_OUTOF10: 10 - WORST POSSIBLE PAIN

## 2024-09-11 ASSESSMENT — PAIN - FUNCTIONAL ASSESSMENT
PAIN_FUNCTIONAL_ASSESSMENT: 0-10
PAIN_FUNCTIONAL_ASSESSMENT: 0-10

## 2024-09-11 ASSESSMENT — PAIN DESCRIPTION - DESCRIPTORS
DESCRIPTORS: BURNING;SHARP
DESCRIPTORS: DULL;BURNING;SHARP

## 2024-09-11 NOTE — ANESTHESIA PREPROCEDURE EVALUATION
Patient: Julissa Chaudhari    Procedure Information       Date/Time: 09/11/24 0730    Procedure: INCISION & DRAINAGE OF LEFT ISHCIORECTAL (PERIRECTAL ABSCESS (Left) - Incision and drainage of left ishciorectal (perirectal) abscess; general anesthesia; lithotomy position with candycane stirrups; will need 1 inch and 1/2 inch Penrose drains with a 2-0 silk suture ligature    Location: [facility] 06 / [facility]    Surgeons: Lexa Pillai MD            Relevant Problems   Anesthesia (within normal limits)      Cardiac   (+) High cholesterol      Neuro   (+) Anxiety   (+) Lumbar radiculopathy   (+) Panic attacks      Musculoskeletal   (+) Osteoarthritis      ID   (+) MSSA (methicillin susceptible Staphylococcus aureus)       Clinical information reviewed:   Tobacco  Allergies  Meds   Med Hx  Surg Hx   Fam Hx          NPO Detail:  NPO/Void Status  Carbohydrate Drink Given Prior to Surgery? : N  Date of Last Liquid: 09/11/24  Time of Last Liquid: 0500  Date of Last Solid: 09/10/24  Time of Last Solid: 1800  Last Intake Type: Clear fluids         Physical Exam    Airway  Mallampati: II  TM distance: >3 FB  Neck ROM: full     Cardiovascular   Rhythm: regular  Rate: normal     Dental - normal exam     Pulmonary    Abdominal        Anesthesia Plan    History of general anesthesia?: yes  History of complications of general anesthesia?: no    ASA 3     general     The patient is not a current smoker.  Patient was not previously instructed to abstain from smoking on day of procedure.  Patient did not smoke on day of procedure.    intravenous induction   Anesthetic plan and risks discussed with patient.  Use of blood products discussed with patient who.    Plan discussed with CRNA.

## 2024-09-11 NOTE — OP NOTE
INCISION & DRAINAGE OF LEFT ISHCIORECTAL (PERIRECTAL ABSCESS (L) Operative Note     Date: 2024  OR Location: [facility]    Name: Julissa Chaudhari, : 1970, Age: 54 y.o., MRN: [id number], Sex: female    Diagnosis  Pre-op Diagnosis      * Ischiorectal abscess [K61.39] Post-op Diagnosis     * Ischiorectal abscess [K61.39]     Procedures  INCISION & DRAINAGE OF LEFT ISHCIORECTAL (PERIRECTAL ABSCESS  45577 - AK I&D ISCHIORECTAL&/PERIRECTAL ABSCESS SPX      Surgeons      * Lexa Pillai - Primary    Resident/Fellow/Other Assistant:  Mireya Alvarado SA    Procedure Summary  Anesthesia: General  ASA: III  Anesthesia Staff: Anesthesiologist: Edis Beal MD  Estimated Blood Loss: < 5 mL  Intra-op Medications: * Intraprocedure medication information is unavailable because the case start and end events have not been set *           Anesthesia Record               Intraprocedure I/O Totals       None           Specimen: No specimens collected     Staff:   Circulator: Margo  Scrub Person: Edis         Drains and/or Catheters: 1/2 inch Penrose drain    Tourniquet Times:         Implants:     Findings: See below    Indications: Julissa Chaudhari is an 54 y.o. female who is having surgery for Ischiorectal abscess [K61.39].     The patient was seen in the preoperative area. The risks, benefits, complications, treatment options, non-operative alternatives, expected recovery and outcomes were discussed with the patient. The possibilities of reaction to medication, pulmonary aspiration, injury to surrounding structures, bleeding, recurrent infection, the need for additional procedures, failure to diagnose a condition, and creating a complication requiring transfusion or operation were discussed with the patient. The patient concurred with the proposed plan, giving informed consent.  The site of surgery was properly noted/marked if necessary per policy. The patient has been actively warmed in preoperative area.  Preoperative antibiotics have been ordered and given within 1 hours of incision. Venous thrombosis prophylaxis have been ordered including bilateral sequential compression devices    Procedure Details:     Findings:     The patient had a 4 cm radial scar directly left laterally with a more distal old drain scar; there was a 15 cm sagittally by 10 cm transverse area of induration and erythema with central fluctuance directly beneath a relatively soft thin scar; the abscess cavity was directly beneath the scar and contained roughly 60 cc of thick purulent fluid; the abscess cavity extended 6 cm in depth     Specimens:  None     Culture:  Abscess     Procedure:     The patient was taken to the operating room placed on the table in the supine position. Preoperative huddle was accomplished with the OR team and the patient. Satisfactory general endotracheal anesthesia was achieved.  The patient was placed in the perineal lithotomy position using candycane stirrups.  The  perianal region and perineum and external genitalia were prepared and draped in normal sterile fashion. The surgical site was marked by the surgeon preoperatively. After the appropriate timeout, the case commenced.     Using an 18-gauge needle and 10 cc syringe, the abscess was aspirated and sent to microbiology for culture.    A 5 cm radial incision was made directly over the previous scar and the abscess was entered, and the fluid was evacuated.  The cavity was copiously irrigated with saline.  A 1/2 inch Penrose drain was placed in the abscess cavity to its for this depth, and exited through a lateral stab wound, was secured to itself using 2-0 silk sutures x 2.  Hemostasis obtained using minimal electrocautery.  The cavity was then packed with Betadine-moistened Kerlix followed by 4 x 4 gauze, ABD pad, and mesh panties.  This completed the operation.     The patient tolerated the procedure well. Sponge, needle, and instrument counts were correct times  2. Total IVF were 600 cc of crystalloid, EBL was < 5 cc, and urine output was not measured.  The patient was extubated in the operating room, and taken to the PACU with stable vital signs and in excellent condition.     Lexa Pillai M.D.    Complications:  None; patient tolerated the procedure well.    Disposition: PACU - hemodynamically stable.  Condition: stable         Additional Details: none    Attending Attestation: I performed the procedure.    Lexa Pillai  Phone Number: [phone]

## 2024-09-11 NOTE — ANESTHESIA POSTPROCEDURE EVALUATION
Patient: Julissa Chaudhari    Procedure Summary       Date: 09/11/24 Room / Location: [facility] / [facility]    Anesthesia Start: 0740 Anesthesia Stop:     Procedure: INCISION & DRAINAGE OF LEFT ISHCIORECTAL (PERIRECTAL ABSCESS (Left) Diagnosis:       Ischiorectal abscess      (Ischiorectal abscess [K61.39])    Surgeons: Lexa Pillai MD Responsible Provider: Edis Beal MD    Anesthesia Type: general ASA Status: 3            Anesthesia Type: general    Vitals Value Taken Time   /56 09/11/24 0830   Temp 36.7 09/11/24 0832   Pulse 89 09/11/24 0830   Resp 14 09/11/24 0832   SpO2 100 % 09/11/24 0830   Vitals shown include unfiled device data.    Anesthesia Post Evaluation    Patient location during evaluation: PACU  Patient participation: complete - patient participated  Level of consciousness: awake and sleepy but conscious  Pain score: 0  Pain management: adequate  Airway patency: patent  Cardiovascular status: acceptable and hemodynamically stable  Respiratory status: acceptable and face mask  Hydration status: acceptable  Postoperative Nausea and Vomiting: none    36.    There were no known notable events for this encounter.

## 2024-09-11 NOTE — ANESTHESIA PROCEDURE NOTES
Airway  Date/Time: 9/11/2024 7:47 AM  Urgency: elective    Airway not difficult    Staffing  Performed: RD   Authorized by: Edis Beal MD    Performed by: RD Garrido  Patient location during procedure: OR    Indications and Patient Condition  Indications for airway management: anesthesia  Spontaneous Ventilation: absent  Sedation level: deep  Preoxygenated: yes  Mask difficulty assessment: 1 - vent by mask  Planned trial extubation    Final Airway Details  Final airway type: endotracheal airway      Successful airway: ETT  Cuffed: yes   Successful intubation technique: video laryngoscopy (de la torre)  Facilitating devices/methods: intubating stylet  Blade: Shanell  Blade size: #3  ETT size (mm): 7.0  Cormack-Lehane Classification: grade I - full view of glottis  Placement verified by: chest auscultation and capnometry   Measured from: lips  ETT to lips (cm): 22  Number of attempts at approach: 1  Number of other approaches attempted: 0

## 2024-09-11 NOTE — DISCHARGE INSTRUCTIONS
For postoperative analgesia/pain relief, I recommend:     a.  Tylenol Extra Strength 500 - 1000 mg with ibuprofen 600 - 800 mg (three or four, 200 mg Advil or Motrin tablets ) 4 times a day with food, on schedule, for 2-3 days, then as needed thereafter.      b.  Supplement with tramadol 50 mg, dispense #20 for more severe or breakthrough pain, as needed.  This has been electronically prescribed to your pharmacy.  Please  this prescription within 7 days of today's visit, or the pharmacist will not fill the prescription.

## 2024-09-14 LAB
B-LACTAMASE ORGANISM ISLT: POSITIVE
BACTERIA SPEC CULT: ABNORMAL
BACTERIA SPEC CULT: ABNORMAL
GRAM STN SPEC: ABNORMAL
GRAM STN SPEC: ABNORMAL

## 2024-09-18 ENCOUNTER — APPOINTMENT (OUTPATIENT)
Dept: SURGERY | Facility: CLINIC | Age: 54
End: 2024-09-18
Payer: COMMERCIAL

## 2024-09-18 VITALS
TEMPERATURE: 98 F | SYSTOLIC BLOOD PRESSURE: 145 MMHG | WEIGHT: 196 LBS | DIASTOLIC BLOOD PRESSURE: 91 MMHG | HEART RATE: 76 BPM | HEIGHT: 70 IN | BODY MASS INDEX: 28.06 KG/M2 | OXYGEN SATURATION: 98 % | RESPIRATION RATE: 18 BRPM

## 2024-09-18 DIAGNOSIS — K61.39 ISCHIORECTAL ABSCESS: Primary | ICD-10-CM

## 2024-09-18 DIAGNOSIS — Z98.890 STATUS POST INCISION AND DRAINAGE: ICD-10-CM

## 2024-09-18 PROCEDURE — 3008F BODY MASS INDEX DOCD: CPT | Performed by: SURGERY

## 2024-09-18 PROCEDURE — 1036F TOBACCO NON-USER: CPT | Performed by: SURGERY

## 2024-09-18 PROCEDURE — 99024 POSTOP FOLLOW-UP VISIT: CPT | Performed by: SURGERY

## 2024-09-18 NOTE — PROGRESS NOTES
Postoperative Visit /established Patient Visit     Julissa presents for her postoperative visit.  On 9/11/2024, the patient underwent:    INCISION & DRAINAGE OF LEFT ISHCIORECTAL (PERIRECTAL ABSCESS  28762 - CT I&D ISCHIORECTAL&/PERIRECTAL ABSCESS SPX    See the operative note for details.    Culture revealed:    Tissue/Wound Culture/Smear (4+) Abundant Streptococcus anginosus group Abnormal       (4+) Abundant Mixed Aerobic and Anaerobic Bacteria      Beta Lactamase (Cefinase) Positive                Gram Stain  Abnormal   (2+) Few Polymorphonuclear leukocytes      (4+) Abundant Gram positive cocci              Resulting Agency: [facility]     Susceptibility       Streptococcus anginosus group     DISK DIFFUSION GRADIENT DIFFUSION    Ceftriaxone  Susceptible    Penicillin  Susceptible    Tetracycline Susceptible     Vancomycin Susceptible              Patient feels much better and has done well since her operation a week ago.  She is tolerating the Augmentin and has about 3 days left.  She used all 20 of the tramadol.  She still using ibuprofen and Tylenol as necessary.  She is doing sitz bath 3 times a day.  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  She has had no fever chills or sweats.      9/10/2024:  The patient was last seen by me on 7/8/2024, 2 months ago.  Please see that note for details.  On Saturday, 9/7/2024, the patient developed some minor discomfort at the site of her previous perirectal abscess, left anteriorly.  This progressed to the point that last evening she had significant swelling and pain.  She has no drainage.  She had a low-grade fever.  She did go to the emergency department [facility] last night but was unable to be seen as the ER was very crowded and she had already waited for 4 hours.  She is seeing me today.  No other symptoms.     7/8/2024  Julissa Chaudhari is a 53 y.o. female who presents for an office visit.  She was last seen by me on 4/24/2024 for her third postoperative visit.      Julissa returns as she has had some recurrent left buttock pain similar to the discomfort she had prior to her incision and drainage in early March.  She describes this as a dull aching sensation.  She has had no spotting or drainage in the perianal region.  She has had no change in her bowel habits.  She would like reevaluated precautionary.     The patient also has chronic low back pain and issues, and is not sure if this pain is related to her back.  She did see her spine physician recently.  Physical therapy has been ordered, with possible MRI if physical therapy is not successful.     Patient also had a colonoscopy on 6/27/2024 at [facility] and that note was reviewed.  2 hyperplastic polyps were noted on pathology.        4/24/2024:  Without complaints.  She had no issues while vacationing in [place].  She thinks all of her symptoms have improved.  She has had no pain swelling or drainage.  The hardness along the old scar has softened up.     3/27/24:   Patient feels much better.  No pain.  Very minimal drainage.  She is wearing 1 pad a day with only drops of discharge.  She is back to full activity.     3/13/24:  Julissa Chaudhari presents for her postoperative visit.     On 3/2/2024, the patient underwent the following procedure urgently after being seen in the emergency department:      Incision and Drainage Anus/Rectum  45885 - MI I&D ISCHIORECTAL&/PERIRECTAL ABSCESS SPX     Rectal exam under anesthesia     Diagnostic anoscopy     She was discharged to home postoperatively on tramadol and Augmentin, with the drain in place.  Please see the operative note for details.     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 Narendra Ruiz from the [facility] in a two-step procedure.      Since discharge, the patient has done quite well.  She has irritation from the drain.  She did have pain for the first 2 to 3 days relieved by tramadol.  She has had no fever chills or sweats.  She is eating well with normal bowel habits.  She finished the Augmentin prescription.  She returned to work on 3/11/2024, 2 days ago.     The patient is single.  She lives in [place].  Her friend, Yanely Christianson, is a former patient of mine.  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     Past Medical History   Medical History   No past medical history on file.         Surgical History    Surgical History             Past Surgical History:   Procedure Laterality Date    LUMBAR FUSION                Allergies   RX Allergies   No Known Allergies         Home Meds          Current Outpatient Medications   Medication Instructions    celecoxib (CeleBREX) 200 mg capsule 1 capsule, oral, Daily before breakfast    citalopram (CELEXA) 20 mg, oral, Daily, Every afternoon    diclofenac sodium (Voltaren) 1 % gel Topical, 4 times daily    DOCOSAHEXAENOIC ACID ORAL oral, Daily RT    Golytely 236-22.74-6.74 -5.86 gram solution MIX AND DRINK AS DIRECTED    lifitegrast (Xiidra) 5 % dropperette ophthalmic (eye), Take per directed    mupirocin (Bactroban) 2 % ointment nasal         Family History    Family History   No family history on file.         Social History  Social History   Social History                Tobacco Use    Smoking status: Never    Smokeless tobacco: Never   Substance Use Topics    Alcohol use: Yes       Comment: occasional    Drug use: Yes       Types: Marijuana       Comment: medical            Review Of Systems    Review of Systems     General:  Not Present- Obesity, Cancer, HIV, MRSA, Recent Cold/Flu, Tired During the Day and VRE.  HEENT: Not Present- Migraine, Cataracts, Glaucoma, Macular Degeneration and Retinal Detachment.  Respiratory: Not Present- Asthma, Chronic Cough, Difficulty Breathing on Exertion, Difficulty Breathing at Rest, Emphysema, Frequent Bronchitis, Home CPAP/BiPAP, Home Oxygen, Pulmonary Embolus, Pneumonia/TB, Sleep Apnea and Snoring.  Cardiovascular: Not Present- Chest Pain, Congestive Heart Failure, Heart Attack, Coronary Artery Disease, Heart Stent, High Cholesterol/Lipids, Hypertension, Internal Defibrillator, Irregular Heart Beat, Mitral Valve Prolapse, Murmur, Pacemaker and Peripheral Vascular Disease.  Gastrointestinal: Not Present- Heartburn, Hepatitis, Hiatal Hernia, Jaundice, Reflux, Stomach Ulcer and IBS.  Female Genitourinary: Not Present- Kidney Failure, Kidney Stones, Dialysis and Urinary Tract Infection.  Musculoskeletal: Not Present- Arthritis, Back Pain and Fibromyalgia.  Neurological: Not Present- Headaches, Numbness, Tingling, Seizures, Stroke,  Shunt and Weakness.  Psychiatric: Not Present- Anxiety, Bipolar and Panic Attacks.  Endocrine: Not Present- Diabetes, Hyperthyroidism, Hypothyroidism and Low Blood Sugar.  Hematology: Not Present- Abnormal Bleeding, Anemia and Blood Clots.     Vitals  There were no vitals taken for this visit.           Physical Exam      Aleshia, my medical assistant, chaperoned and assisted    The patient was examined in the prone jackknife position on the proctoscopy table     Anorectal exam:     Left laterally, the Penrose drain is intact.  This was removed.  The open wounds are clean dry and granulating.  There is a 5 cm radial open wound medially that is 3 cm in depth.  1 cm lateral to this, as the exit site from the drain and this is about 1 cm in length.  Dry dressing placed.  Well-tolerated.    Assessment/Plan      Julissa had a recurrent left anterior perirectal or ischiorectal  abscess, and this was drained 1 week ago on 9/11/2024.  Her postoperative course is satisfactory and this is healing well.  The drain was removed today.     This was last drained emergently on 3/2/2024.  Please see the above for details.         Recommendations:       Complete Augmentin 875 mg p.o. twice daily     Tylenol or ibuprofen for discomfort    May decrease his best to once a day or as needed    Pad to collect drainage    Follow-up with me in 2 weeks for recheck    Will review with Dr. Ruiz, her colorectal surgeon from [facility] in the meantime

## 2024-09-26 ENCOUNTER — TELEPHONE (OUTPATIENT)
Dept: SURGERY | Facility: CLINIC | Age: 54
End: 2024-09-26
Payer: COMMERCIAL
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
## 2024-10-02 ENCOUNTER — APPOINTMENT (OUTPATIENT)
Dept: SURGERY | Facility: CLINIC | Age: 54
End: 2024-10-02
Payer: COMMERCIAL

## 2024-10-02 VITALS
WEIGHT: 197 LBS | OXYGEN SATURATION: 97 % | HEIGHT: 70 IN | TEMPERATURE: 97.8 F | HEART RATE: 76 BPM | SYSTOLIC BLOOD PRESSURE: 117 MMHG | BODY MASS INDEX: 28.2 KG/M2 | RESPIRATION RATE: 17 BRPM | DIASTOLIC BLOOD PRESSURE: 83 MMHG

## 2024-10-02 DIAGNOSIS — Z98.890 STATUS POST INCISION AND DRAINAGE: ICD-10-CM

## 2024-10-02 DIAGNOSIS — K61.39 ISCHIORECTAL ABSCESS: Primary | ICD-10-CM

## 2024-10-02 PROCEDURE — 1036F TOBACCO NON-USER: CPT | Performed by: SURGERY

## 2024-10-02 PROCEDURE — 99024 POSTOP FOLLOW-UP VISIT: CPT | Performed by: SURGERY

## 2024-10-02 PROCEDURE — 3008F BODY MASS INDEX DOCD: CPT | Performed by: SURGERY

## 2024-10-02 NOTE — PROGRESS NOTES
Second postoperative Visit /established Patient Visit     Patient feels better overall.  She has irritation along the incisions but no pain per se.  She is taking no analgesics.  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  She is doing some yoga.  She has not golfed.  She places 1 pad during the day with roughly a teaspoon of drainage or slightly more, and changes the pad at night.  She is having normal bowel habits.  No fever chills or sweats.    I did review her previous care at [facility] per Dr. Ruiz.  There is an op note for a fistulotomy and seton placement on 8/7/2014.  There is mention of a possible second stage fistulotomy or advancement flap, but I do not see an operative note for this.  The patient notes that she only had one procedure.    9/18/2024:  Julissa presents for her postoperative visit.  On 9/11/2024, the patient underwent:     INCISION & DRAINAGE OF LEFT ISHCIORECTAL (PERIRECTAL ABSCESS  77950 - HI I&D ISCHIORECTAL&/PERIRECTAL ABSCESS SPX     See the operative note for details.     Culture revealed:     Tissue/Wound Culture/Smear     (4+) Abundant Streptococcus anginosus group Abnormal        (4+) Abundant Mixed Aerobic and Anaerobic Bacteria       Beta Lactamase (Cefinase) Positive                   Gram Stain  Abnormal   (2+) Few Polymorphonuclear leukocytes       (4+) Abundant Gram positive cocci                Resulting Agency: [facility]      Susceptibility                 Streptococcus anginosus group       DISK DIFFUSION GRADIENT DIFFUSION     Ceftriaxone   Susceptible     Penicillin   Susceptible     Tetracycline Susceptible       Vancomycin Susceptible                  Patient feels much better and has done well since her operation a week ago.  She is tolerating the Augmentin and has about 3 days left.  She used all 20 of the tramadol.  She still using ibuprofen and Tylenol as necessary.  She is doing sitz bath 3 times a day.  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  She  has had no fever chills or sweats.        9/10/2024:  The patient was last seen by me on 7/8/2024, 2 months ago.  Please see that note for details.  On Saturday, 9/7/2024, the patient developed some minor discomfort at the site of her previous perirectal abscess, left anteriorly.  This progressed to the point that last evening she had significant swelling and pain.  She has no drainage.  She had a low-grade fever.  She did go to the emergency department [facility] last night but was unable to be seen as the ER was very crowded and she had already waited for 4 hours.  She is seeing me today.  No other symptoms.     7/8/2024  Julissa Chaudhari is a 53 y.o. female who presents for an office visit.  She was last seen by me on 4/24/2024 for her third postoperative visit.     Julissa returns as she has had some recurrent left buttock pain similar to the discomfort she had prior to her incision and drainage in early March.  She describes this as a dull aching sensation.  She has had no spotting or drainage in the perianal region.  She has had no change in her bowel habits.  She would like reevaluated precautionary.     The patient also has chronic low back pain and issues, and is not sure if this pain is related to her back.  She did see her spine physician recently.  Physical therapy has been ordered, with possible MRI if physical therapy is not successful.     Patient also had a colonoscopy on 6/27/2024 at [facility] and that note was reviewed.  2 hyperplastic polyps were noted on pathology.        4/24/2024:  Without complaints.  She had no issues while vacationing in [place].  She thinks all of her symptoms have improved.  She has had no pain swelling or drainage.  The hardness along the old scar has softened up.     3/27/24:   Patient feels much better.  No pain.  Very minimal drainage.  She is wearing 1 pad a day with only drops of discharge.  She is back to full activity.     3/13/24:  Julissa Chaudhari presents for her  postoperative visit.     On 3/2/2024, the patient underwent the following procedure urgently after being seen in the emergency department:      Incision and Drainage Anus/Rectum  61006 - [place] I&D ISCHIORECTAL&/PERIRECTAL ABSCESS SPX     Rectal exam under anesthesia     Diagnostic anoscopy     She was discharged to home postoperatively on tramadol and Augmentin, with the drain in place.  Please see the operative note for details.     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 Narendra Ruiz from the [facility] in a two-step procedure.      Since discharge, the patient has done quite well.  She has irritation from the drain.  She did have pain for the first 2 to 3 days relieved by tramadol.  She has had no fever chills or sweats.  She is eating well with normal bowel habits.  She finished the Augmentin prescription.  She returned to work on 3/11/2024, 2 days ago.     The patient is single.  She lives in [place].  Her friend, Yanely Christianson, is a former patient of mine.  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     Past Medical History   Medical History   No past medical history on file.         Surgical History    Surgical History             Past Surgical History:   Procedure Laterality Date    LUMBAR FUSION                Allergies   RX Allergies   No Known Allergies         Home Meds          Current Outpatient Medications   Medication Instructions    celecoxib (CeleBREX) 200 mg capsule 1 capsule, oral,  Daily before breakfast    citalopram (CELEXA) 20 mg, oral, Daily, Every afternoon    diclofenac sodium (Voltaren) 1 % gel Topical, 4 times daily    DOCOSAHEXAENOIC ACID ORAL oral, Daily RT    Golytely 236-22.74-6.74 -5.86 gram solution MIX AND DRINK AS DIRECTED    lifitegrast (Xiidra) 5 % dropperette ophthalmic (eye), Take per directed    mupirocin (Bactroban) 2 % ointment nasal         Family History    Family History   No family history on file.         Social History  Social History   Social History                Tobacco Use    Smoking status: Never    Smokeless tobacco: Never   Substance Use Topics    Alcohol use: Yes       Comment: occasional    Drug use: Yes       Types: Marijuana       Comment: medical            Review Of Systems    Review of Systems     General: Not Present- Obesity, Cancer, HIV, MRSA, Recent Cold/Flu, Tired During the Day and VRE.  HEENT: Not Present- Migraine, Cataracts, Glaucoma, Macular Degeneration and Retinal Detachment.  Respiratory: Not Present- Asthma, Chronic Cough, Difficulty Breathing on Exertion, Difficulty Breathing at Rest, Emphysema, Frequent Bronchitis, Home CPAP/BiPAP, Home Oxygen, Pulmonary Embolus, Pneumonia/TB, Sleep Apnea and Snoring.  Cardiovascular: Not Present- Chest Pain, Congestive Heart Failure, Heart Attack, Coronary Artery Disease, Heart Stent, High Cholesterol/Lipids, Hypertension, Internal Defibrillator, Irregular Heart Beat, Mitral Valve Prolapse, Murmur, Pacemaker and Peripheral Vascular Disease.  Gastrointestinal: Not Present- Heartburn, Hepatitis, Hiatal Hernia, Jaundice, Reflux, Stomach Ulcer and IBS.  Female Genitourinary: Not Present- Kidney Failure, Kidney Stones, Dialysis and Urinary Tract Infection.  Musculoskeletal: Not Present- Arthritis, Back Pain and Fibromyalgia.  Neurological: Not Present- Headaches, Numbness, Tingling, Seizures, Stroke,  Shunt and Weakness.  Psychiatric: Not Present- Anxiety, Bipolar and Panic Attacks.  Endocrine: Not  Present- Diabetes, Hyperthyroidism, Hypothyroidism and Low Blood Sugar.  Hematology: Not Present- Abnormal Bleeding, Anemia and Blood Clots.     Vitals  There were no vitals taken for this visit.         Physical Exam      Aleshia, my medical assistant, chaperoned and assisted     The patient was examined in the prone jackknife position on the proctoscopy table     Anorectal exam:     Left lateral:     The lateral drain site is completely healed epithelialized.    The 5 cm radial open wound medially is epithelialized bilaterally, and there is a central area of granulation tissue 3 x 0.5 cm and this is very superficial.  No sinus tracts.    Limited digital rectal exam reveals no tenderness or mass left laterally.         Assessment/Plan      Julissa had a recurrent left anterior perirectal or ischiorectal abscess, and this was drained 1 week ago on 9/11/2024.  Her postoperative course is satisfactory and this is healing well.  The drain was removed on 9/18/2024.    Today's examination is satisfactory and the wound is healing well.     This was previously drained emergently on 3/2/2024.  Please see the above for details.         Recommendations:       Pad to collect drainage as needed; may discontinue when no further drainage    Activity as tolerated     Follow-up with me in 4 weeks for recheck    Once completely healed, we will likely perform diagnostic anoscopy at my [facility] office thereafter.

## 2024-10-30 ENCOUNTER — APPOINTMENT (OUTPATIENT)
Dept: SURGERY | Facility: CLINIC | Age: 54
End: 2024-10-30
Payer: COMMERCIAL

## 2024-10-30 VITALS
OXYGEN SATURATION: 97 % | TEMPERATURE: 97.7 F | DIASTOLIC BLOOD PRESSURE: 82 MMHG | WEIGHT: 199 LBS | BODY MASS INDEX: 28.49 KG/M2 | RESPIRATION RATE: 18 BRPM | HEIGHT: 70 IN | HEART RATE: 87 BPM | SYSTOLIC BLOOD PRESSURE: 117 MMHG

## 2024-10-30 DIAGNOSIS — K61.39 ISCHIORECTAL ABSCESS: Primary | ICD-10-CM

## 2024-10-30 DIAGNOSIS — Z98.890 STATUS POST INCISION AND DRAINAGE: ICD-10-CM

## 2024-10-30 PROCEDURE — 99024 POSTOP FOLLOW-UP VISIT: CPT | Performed by: SURGERY

## 2024-10-30 PROCEDURE — 3008F BODY MASS INDEX DOCD: CPT | Performed by: SURGERY

## 2024-11-06 ENCOUNTER — OFFICE VISIT (OUTPATIENT)
Dept: SURGERY | Facility: CLINIC | Age: 54
End: 2024-11-06
Payer: COMMERCIAL

## 2024-11-06 VITALS
DIASTOLIC BLOOD PRESSURE: 80 MMHG | OXYGEN SATURATION: 97 % | WEIGHT: 197 LBS | HEIGHT: 70 IN | RESPIRATION RATE: 17 BRPM | SYSTOLIC BLOOD PRESSURE: 131 MMHG | BODY MASS INDEX: 28.2 KG/M2 | HEART RATE: 79 BPM | TEMPERATURE: 97.7 F

## 2024-11-06 DIAGNOSIS — K61.39 ISCHIORECTAL ABSCESS: Primary | ICD-10-CM

## 2024-11-06 DIAGNOSIS — Z98.890 STATUS POST INCISION AND DRAINAGE: ICD-10-CM

## 2024-11-06 PROCEDURE — 3008F BODY MASS INDEX DOCD: CPT | Performed by: SURGERY

## 2024-11-06 PROCEDURE — 99024 POSTOP FOLLOW-UP VISIT: CPT | Performed by: SURGERY

## 2024-11-06 NOTE — PROGRESS NOTES
Fourth Postoperative Visit /Established Patient Visit     Julissa returns earlier than scheduled.  She was seen by me just 1 week ago.  She was to see me in December for a anoscopy at my [place] office.    The patient was doing well until Gaurav, November 3, when she noted spotting of blood, silver dollar size, on her underwear x 2.  Since then she has had minimal spotting.  She has been wearing a pad daily.  She denies any swelling hardness or pain.  She denies fever chills or sweats.    10/30/2024:  Patient feels better.  She has had no drainage or spotting for roughly 10 days.  She has no pain or swelling.     10/2/24:  Patient feels better overall.  She has irritation along the incisions but no pain per se.  She is taking no analgesics.  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  She is doing some yoga.  She has not golfed.  She places 1 pad during the day with roughly a teaspoon of drainage or slightly more, and changes the pad at night.  She is having normal bowel habits.  No fever chills or sweats.     I did review her previous care at [facility] per Dr. Ruiz.  There is an op note for a fistulotomy and seton placement on 8/7/2014.  There is mention of a possible second stage fistulotomy or advancement flap, but I do not see an operative note for this.  The patient notes that she only had one procedure.     9/18/2024:  Julissa presents for her postoperative visit.  On 9/11/2024, the patient underwent:     INCISION & DRAINAGE OF LEFT ISHCIORECTAL (PERIRECTAL ABSCESS  15874 - [place] I&D ISCHIORECTAL&/PERIRECTAL ABSCESS SPX     See the operative note for details.     Culture revealed:     Tissue/Wound Culture/Smear       (4+) Abundant Streptococcus anginosus group Abnormal        (4+) Abundant Mixed Aerobic and Anaerobic Bacteria       Beta Lactamase (Cefinase) Positive                   Gram Stain  Abnormal   (2+) Few Polymorphonuclear leukocytes       (4+) Abundant Gram positive cocci                 Resulting Agency: [facility]      Susceptibility                      Streptococcus anginosus group       DISK DIFFUSION GRADIENT DIFFUSION     Ceftriaxone   Susceptible     Penicillin   Susceptible     Tetracycline Susceptible       Vancomycin Susceptible                  Patient feels much better and has done well since her operation a week ago.  She is tolerating the Augmentin and has about 3 days left.  She used all 20 of the tramadol.  She still using ibuprofen and Tylenol as necessary.  She is doing sitz bath 3 times a day.  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  She has had no fever chills or sweats.        9/10/2024:  The patient was last seen by me on 7/8/2024, 2 months ago.  Please see that note for details.  On Saturday, 9/7/2024, the patient developed some minor discomfort at the site of her previous perirectal abscess, left anteriorly.  This progressed to the point that last evening she had significant swelling and pain.  She has no drainage.  She had a low-grade fever.  She did go to the emergency department [facility] last night but was unable to be seen as the ER was very crowded and she had already waited for 4 hours.  She is seeing me today.  No other symptoms.     7/8/2024  Julissa Chaudhari is a 53 y.o. female who presents for an office visit.  She was last seen by me on 4/24/2024 for her third postoperative visit.     Julissa returns as she has had some recurrent left buttock pain similar to the discomfort she had prior to her incision and drainage in early March.  She describes this as a dull aching sensation.  She has had no spotting or drainage in the perianal region.  She has had no change in her bowel habits.  She would like reevaluated precautionary.     The patient also has chronic low back pain and issues, and is not sure if this pain is related to her back.  She did see her spine physician recently.  Physical therapy has been ordered, with possible MRI if physical therapy is not  successful.     Patient also had a colonoscopy on 6/27/2024 at [facility] and that note was reviewed.  2 hyperplastic polyps were noted on pathology.        4/24/2024:  Without complaints.  She had no issues while vacationing in [place].  She thinks all of her symptoms have improved.  She has had no pain swelling or drainage.  The hardness along the old scar has softened up.     3/27/24:   Patient feels much better.  No pain.  Very minimal drainage.  She is wearing 1 pad a day with only drops of discharge.  She is back to full activity.     3/13/24:  Julissa Chaudhari presents for her postoperative visit.     On 3/2/2024, the patient underwent the following procedure urgently after being seen in the emergency department:      Incision and Drainage Anus/Rectum  06898 - [place] I&D ISCHIORECTAL&/PERIRECTAL ABSCESS SPX     Rectal exam under anesthesia     Diagnostic anoscopy     She was discharged to home postoperatively on tramadol and Augmentin, with the drain in place.  Please see the operative note for details.     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 Narendra Ruiz from the [facility] in a two-step procedure.      Since discharge, the patient has done quite well.  She has irritation from the drain.  She did have pain for the first 2 to 3 days relieved by tramadol.  She has had no fever chills or sweats.  She is eating well with normal bowel habits.  She finished the Augmentin prescription.  She returned to work on 3/11/2024, 2 days ago.     The patient is single.  She lives in  [place].  Her friend, Yanely Christianson, is a former patient of mine.  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     Past Medical History   Medical History   No past medical history on file.         Surgical History    Surgical History             Past Surgical History:   Procedure Laterality Date    LUMBAR FUSION                Allergies   RX Allergies   No Known Allergies         Home Meds          Current Outpatient Medications   Medication Instructions    celecoxib (CeleBREX) 200 mg capsule 1 capsule, oral, Daily before breakfast    citalopram (CELEXA) 20 mg, oral, Daily, Every afternoon    diclofenac sodium (Voltaren) 1 % gel Topical, 4 times daily    DOCOSAHEXAENOIC ACID ORAL oral, Daily RT    Golytely 236-22.74-6.74 -5.86 gram solution MIX AND DRINK AS DIRECTED    lifitegrast (Xiidra) 5 % dropperette ophthalmic (eye), Take per directed    mupirocin (Bactroban) 2 % ointment nasal         Family History    Family History   No family history on file.         Social History  Social History   Social History                Tobacco Use    Smoking status: Never    Smokeless tobacco: Never   Substance Use Topics    Alcohol use: Yes       Comment: occasional    Drug use: Yes       Types: Marijuana       Comment: medical            Review Of Systems    Review of Systems     General: Not Present- Obesity, Cancer, HIV, MRSA, Recent Cold/Flu, Tired During the Day and VRE.  HEENT: Not Present- Migraine, Cataracts, Glaucoma, Macular Degeneration and Retinal Detachment.  Respiratory: Not Present- Asthma, Chronic Cough, Difficulty Breathing on Exertion, Difficulty Breathing at Rest, Emphysema, Frequent Bronchitis, Home CPAP/BiPAP, Home Oxygen, Pulmonary Embolus, Pneumonia/TB, Sleep Apnea and Snoring.  Cardiovascular: Not Present- Chest Pain, Congestive Heart Failure, Heart Attack, Coronary Artery Disease, Heart Stent, High Cholesterol/Lipids,  Hypertension, Internal Defibrillator, Irregular Heart Beat, Mitral Valve Prolapse, Murmur, Pacemaker and Peripheral Vascular Disease.  Gastrointestinal: Not Present- Heartburn, Hepatitis, Hiatal Hernia, Jaundice, Reflux, Stomach Ulcer and IBS.  Female Genitourinary: Not Present- Kidney Failure, Kidney Stones, Dialysis and Urinary Tract Infection.  Musculoskeletal: Not Present- Arthritis, Back Pain and Fibromyalgia.  Neurological: Not Present- Headaches, Numbness, Tingling, Seizures, Stroke,  Shunt and Weakness.  Psychiatric: Not Present- Anxiety, Bipolar and Panic Attacks.  Endocrine: Not Present- Diabetes, Hyperthyroidism, Hypothyroidism and Low Blood Sugar.  Hematology: Not Present- Abnormal Bleeding, Anemia and Blood Clots.     Vitals  There were no vitals taken for this visit.         Physical Exam      Aleshia, my medical assistant, chaperoned and assisted     The patient was examined in the prone jackknife position on the proctoscopy table     Anorectal exam:     Left lateral:      The lateral drain site is completely healed epithelialized.     The 5 cm radial open wound medially is also completely healed and epithelialized except laterally, there is a tiny sinus tract opening which was probably the site of the bleeding; there is some minimal granulation tissue; this was opened about 1 cm in length is very superficial; this small open wound was cauterized with silver nitrate and a dry dressing placed; well-tolerated     Dgital rectal exam reveals no tenderness or mass left laterally; no masses or blood        Assessment/Plan      Julissa had a recurrent left anterior perirectal or ischiorectal abscess, and this was drained on 9/11/2024.      The drain was removed on 9/18/2024.     Today, the lateral aspect of the wound was very superficially open as noted above as there is some persistent granulation tissue, likely causing bleeding.     This was previously drained emergently on 3/2/2024.  Please see the above  for details.         Recommendations:       No activity restrictions    Keep the perianal region clean and dry    Gauze or pad to be applied to the perianal region at the site of the scar/wound to collect drainage until healed    Sitz baths as neededx    Follow-up in 2 weeks for recheck     Will still plan to see the patient on 12/16/2024 Monday morning at my [facility] office for reevaluation and diagnostic anoscopy.  Fleets enema prior     Practice good colon health:      a.  Avoid constipation and straining with bowel movements  b.  Stay well-hydrated - drink at least six, 8 ounce glasses of fluid daily  c.  High fiber diet  d.  Add supplemental fiber to your diet daily;  may use granulated formula such as Metamucil or Citrucel daily;  I recommend Fiber Well gummies 2 daily which will provide 5 grams of supplemental fiber daily (these are easy to use, and can be purchased over-the-counter at [facility], or other pharmacies)  e.  Stool softener only as needed - may use Colace or similar brand, 100 mg by mouth twice a day  - may be purchased over-the-counter;  limit use

## 2024-11-11 ENCOUNTER — APPOINTMENT (OUTPATIENT)
Dept: SURGERY | Facility: CLINIC | Age: 54
End: 2024-11-11
Payer: COMMERCIAL

## 2024-11-20 ENCOUNTER — APPOINTMENT (OUTPATIENT)
Dept: SURGERY | Facility: CLINIC | Age: 54
End: 2024-11-20
Payer: COMMERCIAL

## 2024-11-20 VITALS
RESPIRATION RATE: 17 BRPM | BODY MASS INDEX: 28.35 KG/M2 | SYSTOLIC BLOOD PRESSURE: 125 MMHG | OXYGEN SATURATION: 97 % | DIASTOLIC BLOOD PRESSURE: 83 MMHG | TEMPERATURE: 97.8 F | WEIGHT: 198 LBS | HEART RATE: 62 BPM | HEIGHT: 70 IN

## 2024-11-20 DIAGNOSIS — R10.13 EPIGASTRIC PAIN: ICD-10-CM

## 2024-11-20 DIAGNOSIS — K61.39 ISCHIORECTAL ABSCESS: Primary | ICD-10-CM

## 2024-11-20 DIAGNOSIS — Z98.890 STATUS POST INCISION AND DRAINAGE: ICD-10-CM

## 2024-11-20 PROCEDURE — 99213 OFFICE O/P EST LOW 20 MIN: CPT | Performed by: SURGERY

## 2024-11-20 PROCEDURE — 3008F BODY MASS INDEX DOCD: CPT | Performed by: SURGERY

## 2024-11-20 NOTE — PROGRESS NOTES
Fifth Postoperative Visit /Established Patient Visit     2-week follow-up.  Patient had some minimal drainage for about a week after the last visit and none since.  No pain.    Patient also notes a new problem which is midepigastric abdominal pain which has been present for about a month.  This is random in timing.  This can last minutes to hours.  This is sharp in nature.  This can occur during the day, evening or sometimes weeks will wake her up in the middle of the night.  This is not related to meals.  This is not postprandial.  There is no radiation of the pain.  There are no associated symptoms such as fever chills sweats nausea or vomiting.  She has never had an intra-abdominal operation before.    11/6/2024:  Julissa returns earlier than scheduled.  She was seen by me just 1 week ago.  She was to see me in December for a anoscopy at my [place] office.     The patient was doing well until Gaurav, November 3, when she noted spotting of blood, silver dollar size, on her underwear x 2.  Since then she has had minimal spotting.  She has been wearing a pad daily.  She denies any swelling hardness or pain.  She denies fever chills or sweats.     10/30/2024:  Patient feels better.  She has had no drainage or spotting for roughly 10 days.  She has no pain or swelling.     10/2/24:  Patient feels better overall.  She has irritation along the incisions but no pain per se.  She is taking no analgesics.  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  She is doing some yoga.  She has not golfed.  She places 1 pad during the day with roughly a teaspoon of drainage or slightly more, and changes the pad at night.  She is having normal bowel habits.  No fever chills or sweats.     I did review her previous care at [facility] per Dr. Ruiz.  There is an op note for a fistulotomy and seton placement on 8/7/2014.  There is mention of a possible second stage fistulotomy or advancement flap, but I do not see an  operative note for this.  The patient notes that she only had one procedure.     9/18/2024:  Julissa presents for her postoperative visit.  On 9/11/2024, the patient underwent:     INCISION & DRAINAGE OF LEFT ISHCIORECTAL (PERIRECTAL ABSCESS  22405 - [place] I&D ISCHIORECTAL&/PERIRECTAL ABSCESS SPX     See the operative note for details.     Culture revealed:     Tissue/Wound Culture/Smear       (4+) Abundant Streptococcus anginosus group Abnormal        (4+) Abundant Mixed Aerobic and Anaerobic Bacteria       Beta Lactamase (Cefinase) Positive                   Gram Stain  Abnormal   (2+) Few Polymorphonuclear leukocytes       (4+) Abundant Gram positive cocci                Resulting Agency: [facility]      Susceptibility                      Streptococcus anginosus group       DISK DIFFUSION GRADIENT DIFFUSION     Ceftriaxone   Susceptible     Penicillin   Susceptible     Tetracycline Susceptible       Vancomycin Susceptible                  Patient feels much better and has done well since her operation a week ago.  She is tolerating the Augmentin and has about 3 days left.  She used all 20 of the tramadol.  She still using ibuprofen and Tylenol as necessary.  She is doing sitz bath 3 times a day.  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  She has had no fever chills or sweats.        9/10/2024:  The patient was last seen by me on 7/8/2024, 2 months ago.  Please see that note for details.  On Saturday, 9/7/2024, the patient developed some minor discomfort at the site of her previous perirectal abscess, left anteriorly.  This progressed to the point that last evening she had significant swelling and pain.  She has no drainage.  She had a low-grade fever.  She did go to the emergency department [facility] last night but was unable to be seen as the ER was very crowded and she had already waited for 4 hours.  She is seeing me today.  No other symptoms.     7/8/2024  Julissa Chaudhari is a 53 y.o. female who presents for  an office visit.  She was last seen by me on 4/24/2024 for her third postoperative visit.     Julissa returns as she has had some recurrent left buttock pain similar to the discomfort she had prior to her incision and drainage in early March.  She describes this as a dull aching sensation.  She has had no spotting or drainage in the perianal region.  She has had no change in her bowel habits.  She would like reevaluated precautionary.     The patient also has chronic low back pain and issues, and is not sure if this pain is related to her back.  She did see her spine physician recently.  Physical therapy has been ordered, with possible MRI if physical therapy is not successful.     Patient also had a colonoscopy on 6/27/2024 at [facility] and that note was reviewed.  2 hyperplastic polyps were noted on pathology.        4/24/2024:  Without complaints.  She had no issues while vacationing in [place].  She thinks all of her symptoms have improved.  She has had no pain swelling or drainage.  The hardness along the old scar has softened up.     3/27/24:   Patient feels much better.  No pain.  Very minimal drainage.  She is wearing 1 pad a day with only drops of discharge.  She is back to full activity.     3/13/24:  Julissa Chaudhari presents for her postoperative visit.     On 3/2/2024, the patient underwent the following procedure urgently after being seen in the emergency department:      Incision and Drainage Anus/Rectum  49280 - [place] I&D ISCHIORECTAL&/PERIRECTAL ABSCESS SPX     Rectal exam under anesthesia     Diagnostic anoscopy     She was discharged to home postoperatively on tramadol and Augmentin, with the drain in place.  Please see the operative note for details.     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 Narendra Ruiz from the [facility] in a two-step procedure.      Since discharge, the patient has done quite well.  She has irritation from the drain.  She did have pain for the first 2 to 3 days relieved by tramadol.  She has had no fever chills or sweats.  She is eating well with normal bowel habits.  She finished the Augmentin prescription.  She returned to work on 3/11/2024, 2 days ago.     The patient is single.  She lives in [place].  Her friend, Yanely Christianson, is a former patient of mine.  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     Past Medical History   Medical History   No past medical history on file.         Surgical History    Surgical History             Past Surgical History:   Procedure Laterality Date    LUMBAR FUSION                Allergies   RX Allergies   No Known Allergies         Home Meds          Current Outpatient Medications   Medication Instructions    celecoxib (CeleBREX) 200 mg capsule 1 capsule, oral, Daily before breakfast    citalopram (CELEXA) 20 mg, oral, Daily, Every afternoon    diclofenac sodium (Voltaren) 1 % gel Topical, 4 times daily    DOCOSAHEXAENOIC ACID ORAL oral, Daily RT    Golytely 236-22.74-6.74 -5.86 gram solution MIX AND DRINK AS DIRECTED    lifitegrast (Xiidra) 5 % dropperette ophthalmic (eye), Take per directed    mupirocin (Bactroban) 2 % ointment nasal         Family History    Family History   No family history on file.         Social History  Social History   Social History                Tobacco Use    Smoking status: Never    Smokeless tobacco: Never   Substance Use Topics    Alcohol use: Yes       Comment: occasional    Drug use: Yes        Types: Marijuana       Comment: medical            Review Of Systems    Review of Systems     General: Not Present- Obesity, Cancer, HIV, MRSA, Recent Cold/Flu, Tired During the Day and VRE.  HEENT: Not Present- Migraine, Cataracts, Glaucoma, Macular Degeneration and Retinal Detachment.  Respiratory: Not Present- Asthma, Chronic Cough, Difficulty Breathing on Exertion, Difficulty Breathing at Rest, Emphysema, Frequent Bronchitis, Home CPAP/BiPAP, Home Oxygen, Pulmonary Embolus, Pneumonia/TB, Sleep Apnea and Snoring.  Cardiovascular: Not Present- Chest Pain, Congestive Heart Failure, Heart Attack, Coronary Artery Disease, Heart Stent, High Cholesterol/Lipids, Hypertension, Internal Defibrillator, Irregular Heart Beat, Mitral Valve Prolapse, Murmur, Pacemaker and Peripheral Vascular Disease.  Gastrointestinal: Not Present- Heartburn, Hepatitis, Hiatal Hernia, Jaundice, Reflux, Stomach Ulcer and IBS.  Female Genitourinary: Not Present- Kidney Failure, Kidney Stones, Dialysis and Urinary Tract Infection.  Musculoskeletal: Not Present- Arthritis, Back Pain and Fibromyalgia.  Neurological: Not Present- Headaches, Numbness, Tingling, Seizures, Stroke,  Shunt and Weakness.  Psychiatric: Not Present- Anxiety, Bipolar and Panic Attacks.  Endocrine: Not Present- Diabetes, Hyperthyroidism, Hypothyroidism and Low Blood Sugar.  Hematology: Not Present- Abnormal Bleeding, Anemia and Blood Clots.     Vitals  There were no vitals taken for this visit.         Physical Exam      Cardiopulmonary negative    Abdominal exam soft benign and nontender particular in the mid epigastrium    Aleshia, my medical assistant, chaperoned and assisted     The patient was examined in the prone jackknife position on the proctoscopy table     Anorectal exam:     Left lateral:      The left lateral lateral drain site is completely healed epithelialized.     The 5 cm left lateral radial wound is completely healed and epithelialized; no sinus track or  opening; no tenderness or drainage    Digital rectal exam deferred    Assessment/Plan      Julissa had a recurrent left anterior perirectal or ischiorectal abscess, and this was drained on 9/11/2024.      The drain was removed on 9/18/2024.     Today the wound is completely healed and epithelialized     This was previously drained emergently on 3/2/2024.  Please see the above for details.      Patient has a new and unrelated symptom of intermittent midepigastric abdominal pain.      Recommendations:       No activity restrictions     Follow-up 12/16/2024 Monday morning at my [facility] ([place], [place]) office for reevaluation and diagnostic anoscopy.  Fleets enema prior     With regard to the mid epigastric abdominal pain, will check an abdominal ultrasound, CMP, CBC, and lipase -will call patient with results and management plan    Addendum 11/25/2024 1413    Laboratory values from 11/22/2024 were normal including CBC and differential, CMP and lipase.    Ultrasound was also performed of the abdomen that day and I personally reviewed the report and the images.  The gallbladder is completely normal.  The rest of the ultrasound is normal also except for some hepatic steatosis.    I called the patient today with these results.  She is still having intermittent pain as described.  Etiology the pain is unclear.  I think the next step is a gastroenterology consult to consider EGD.  I did offer this to the patient.  However, she would like to hold off and see how she does over the next month or so.  We will rediscuss at her next visit.  She will call me in the meantime if symptoms worsen.

## 2024-11-22 ENCOUNTER — HOSPITAL ENCOUNTER (OUTPATIENT)
Dept: RADIOLOGY | Facility: CLINIC | Age: 54
Discharge: HOME | End: 2024-11-22
Payer: COMMERCIAL

## 2024-11-22 ENCOUNTER — LAB (OUTPATIENT)
Dept: LAB | Facility: LAB | Age: 54
End: 2024-11-22
Payer: COMMERCIAL

## 2024-11-22 DIAGNOSIS — R10.13 EPIGASTRIC PAIN: ICD-10-CM

## 2024-11-22 LAB
ALBUMIN SERPL BCP-MCNC: 4.5 G/DL (ref 3.4–5)
ALP SERPL-CCNC: 72 U/L (ref 33–110)
ALT SERPL W P-5'-P-CCNC: 11 U/L (ref 7–45)
ANION GAP SERPL CALC-SCNC: 12 MMOL/L (ref 10–20)
AST SERPL W P-5'-P-CCNC: 12 U/L (ref 9–39)
BILIRUB SERPL-MCNC: 0.5 MG/DL (ref 0–1.2)
BUN SERPL-MCNC: 14 MG/DL (ref 6–23)
CALCIUM SERPL-MCNC: 9.7 MG/DL (ref 8.6–10.3)
CHLORIDE SERPL-SCNC: 103 MMOL/L (ref 98–107)
CO2 SERPL-SCNC: 28 MMOL/L (ref 21–32)
CREAT SERPL-MCNC: 0.76 MG/DL (ref 0.5–1.05)
EGFRCR SERPLBLD CKD-EPI 2021: >90 ML/MIN/1.73M*2
ERYTHROCYTE [DISTWIDTH] IN BLOOD BY AUTOMATED COUNT: 13 % (ref 11.5–14.5)
GLUCOSE SERPL-MCNC: 79 MG/DL (ref 74–99)
HCT VFR BLD AUTO: 40.9 % (ref 36–46)
HGB BLD-MCNC: 13.4 G/DL (ref 12–16)
LIPASE SERPL-CCNC: 8 U/L (ref 9–82)
MCH RBC QN AUTO: 31.1 PG (ref 26–34)
MCHC RBC AUTO-ENTMCNC: 32.8 G/DL (ref 32–36)
MCV RBC AUTO: 95 FL (ref 80–100)
NRBC BLD-RTO: 0 /100 WBCS (ref 0–0)
PLATELET # BLD AUTO: 361 X10*3/UL (ref 150–450)
POTASSIUM SERPL-SCNC: 5.2 MMOL/L (ref 3.5–5.3)
PROT SERPL-MCNC: 7 G/DL (ref 6.4–8.2)
RBC # BLD AUTO: 4.31 X10*6/UL (ref 4–5.2)
SODIUM SERPL-SCNC: 138 MMOL/L (ref 136–145)
WBC # BLD AUTO: 6.5 X10*3/UL (ref 4.4–11.3)

## 2024-11-22 PROCEDURE — 83690 ASSAY OF LIPASE: CPT

## 2024-11-22 PROCEDURE — 76700 US EXAM ABDOM COMPLETE: CPT

## 2024-11-22 PROCEDURE — 36415 COLL VENOUS BLD VENIPUNCTURE: CPT

## 2024-11-22 PROCEDURE — 85027 COMPLETE CBC AUTOMATED: CPT

## 2024-11-22 PROCEDURE — 80053 COMPREHEN METABOLIC PANEL: CPT

## 2024-12-09 ENCOUNTER — TELEPHONE (OUTPATIENT)
Dept: SURGERY | Facility: CLINIC | Age: 54
End: 2024-12-09
Payer: COMMERCIAL

## 2024-12-09 DIAGNOSIS — K61.39 ISCHIORECTAL ABSCESS: Primary | ICD-10-CM

## 2024-12-09 RX ORDER — AMOXICILLIN AND CLAVULANATE POTASSIUM 875; 125 MG/1; MG/1
1 TABLET, FILM COATED ORAL 2 TIMES DAILY
Qty: 20 TABLET | Refills: 0 | Status: SHIPPED | OUTPATIENT
Start: 2024-12-09 | End: 2024-12-19

## 2024-12-09 NOTE — TELEPHONE ENCOUNTER
"Spoke with pt to give the following update per Dr. Pillai. Pt verbally understood     \"1. Sitz baths 15-20 minutes 3 times a day    2. Pad to collect drainage    3. I called in Augmentin 875 p.o. twice daily x 10 days    4. She should keep her appointment to see me a week from today (I cannot see her this week otherwise)\"  "

## 2024-12-09 NOTE — TELEPHONE ENCOUNTER
she called in stating the incision opened up and now has bleeding coming from butt cheek. She's wearing a pad to collect blood and drainage. She's scheduled with you @ [facility] on 12/16/2024.

## 2024-12-16 ENCOUNTER — APPOINTMENT (OUTPATIENT)
Dept: SURGERY | Facility: CLINIC | Age: 54
End: 2024-12-16
Payer: COMMERCIAL

## 2024-12-16 VITALS
HEIGHT: 70 IN | DIASTOLIC BLOOD PRESSURE: 75 MMHG | BODY MASS INDEX: 28.2 KG/M2 | TEMPERATURE: 97.8 F | SYSTOLIC BLOOD PRESSURE: 108 MMHG | HEART RATE: 66 BPM | OXYGEN SATURATION: 97 % | RESPIRATION RATE: 17 BRPM | WEIGHT: 197 LBS

## 2024-12-16 DIAGNOSIS — Z98.890 STATUS POST INCISION AND DRAINAGE: ICD-10-CM

## 2024-12-16 DIAGNOSIS — K61.39 ISCHIORECTAL ABSCESS: Primary | ICD-10-CM

## 2024-12-16 PROCEDURE — 3008F BODY MASS INDEX DOCD: CPT | Performed by: SURGERY

## 2024-12-16 PROCEDURE — 46600 DIAGNOSTIC ANOSCOPY SPX: CPT | Performed by: SURGERY

## 2024-12-16 PROCEDURE — 99213 OFFICE O/P EST LOW 20 MIN: CPT | Performed by: SURGERY

## 2024-12-16 NOTE — PROGRESS NOTES
Established Patient Visit    Patient presents for follow-up evaluation and anoscopy.    Since her last visit, the patient did contact my medical assistant on 2024 at which time she noticed recurrent drainage along the perianal incision.  I recommended the followin. Sitz baths 15-20 minutes 3 times a day    2. Pad to collect drainage    3. I called in Augmentin 875 p.o. twice daily x 10 days    4. She should keep her appointment to see me a week from today (I cannot see her this week otherwise)    The patient notes that the drainage stopped within 24 hours.  She has had no fever chills or sweats.  She was doing sitz baths 3 times daily and now roughly once a day.   she is taking the Augmentin, and this is nearly complete.  She has 3 more days.    Of note, the patient's previous described epigastric pain is completely resolved.    2024:  Laboratory values from 2024 were normal including CBC and differential, CMP and lipase.     Ultrasound was also performed of the abdomen that day and I personally reviewed the report and the images.  The gallbladder is completely normal.  The rest of the ultrasound is normal also except for some hepatic steatosis.     I called the patient today with these results.  She is still having intermittent pain as described.  Etiology the pain is unclear.  I think the next step is a gastroenterology consult to consider EGD.  I did offer this to the patient.  However, she would like to hold off and see how she does over the next month or so.  We will rediscuss at her next visit.  She will call me in the meantime if symptoms worsen.      2024:   2-week follow-up.  Patient had some minimal drainage for about a week after the last visit and none since.  No pain.     Patient also notes a new problem which is midepigastric abdominal pain which has been present for about a month.  This is random in timing.  This can last minutes to hours.  This is sharp in nature.  This  can occur during the day, evening or sometimes weeks will wake her up in the middle of the night.  This is not related to meals.  This is not postprandial.  There is no radiation of the pain.  There are no associated symptoms such as fever chills sweats nausea or vomiting.  She has never had an intra-abdominal operation before.     11/6/2024:  Julissa returns earlier than scheduled.  She was seen by me just 1 week ago.  She was to see me in December for a anoscopy at my [facility] office.     The patient was doing well until Gaurav, November 3, when she noted spotting of blood, silver dollar size, on her underwear x 2.  Since then she has had minimal spotting.  She has been wearing a pad daily.  She denies any swelling hardness or pain.  She denies fever chills or sweats.     10/30/2024:  Patient feels better.  She has had no drainage or spotting for roughly 10 days.  She has no pain or swelling.     10/2/24:  Patient feels better overall.  She has irritation along the incisions but no pain per se.  She is taking no analgesics.  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  She is doing some yoga.  She has not golfed.  She places 1 pad during the day with roughly a teaspoon of drainage or slightly more, and changes the pad at night.  She is having normal bowel habits.  No fever chills or sweats.     I did review her previous care at [facility] per Dr. Ruiz.  There is an op note for a fistulotomy and seton placement on 8/7/2014.  There is mention of a possible second stage fistulotomy or advancement flap, but I do not see an operative note for this.  The patient notes that she only had one procedure.     9/18/2024:  Julissa presents for her postoperative visit.  On 9/11/2024, the patient underwent:     INCISION & DRAINAGE OF LEFT ISHCIORECTAL (PERIRECTAL ABSCESS  62572 - VT I&D ISCHIORECTAL&/PERIRECTAL ABSCESS SPX     See the operative note for details.     Culture revealed:     Tissue/Wound  Culture/Smear       (4+) Abundant Streptococcus anginosus group Abnormal        (4+) Abundant Mixed Aerobic and Anaerobic Bacteria       Beta Lactamase (Cefinase) Positive                   Gram Stain  Abnormal   (2+) Few Polymorphonuclear leukocytes       (4+) Abundant Gram positive cocci                Resulting Agency: [facility]      Susceptibility                      Streptococcus anginosus group       DISK DIFFUSION GRADIENT DIFFUSION     Ceftriaxone   Susceptible     Penicillin   Susceptible     Tetracycline Susceptible       Vancomycin Susceptible                  Patient feels much better and has done well since her operation a week ago.  She is tolerating the Augmentin and has about 3 days left.  She used all 20 of the tramadol.  She still using ibuprofen and Tylenol as necessary.  She is doing sitz bath 3 times a day.  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  She has had no fever chills or sweats.        9/10/2024:  The patient was last seen by me on 7/8/2024, 2 months ago.  Please see that note for details.  On Saturday, 9/7/2024, the patient developed some minor discomfort at the site of her previous perirectal abscess, left anteriorly.  This progressed to the point that last evening she had significant swelling and pain.  She has no drainage.  She had a low-grade fever.  She did go to the emergency department [facility] last night but was unable to be seen as the ER was very crowded and she had already waited for 4 hours.  She is seeing me today.  No other symptoms.     7/8/2024  Julissa Chaudhari is a 53 y.o. female who presents for an office visit.  She was last seen by me on 4/24/2024 for her third postoperative visit.     Julissa returns as she has had some recurrent left buttock pain similar to the discomfort she had prior to her incision and drainage in early March.  She describes this as a dull aching sensation.  She has had no spotting or drainage in the perianal region.  She has had no change  in her bowel habits.  She would like reevaluated precautionary.     The patient also has chronic low back pain and issues, and is not sure if this pain is related to her back.  She did see her spine physician recently.  Physical therapy has been ordered, with possible MRI if physical therapy is not successful.     Patient also had a colonoscopy on 6/27/2024 at [facility] and that note was reviewed.  2 hyperplastic polyps were noted on pathology.     4/24/2024:  Without complaints.  She had no issues while vacationing in [place].  She thinks all of her symptoms have improved.  She has had no pain swelling or drainage.  The hardness along the old scar has softened up.     3/27/24:   Patient feels much better.  No pain.  Very minimal drainage.  She is wearing 1 pad a day with only drops of discharge.  She is back to full activity.     3/13/24:  Julissa Chaudhari presents for her postoperative visit.     On 3/2/2024, the patient underwent the following procedure urgently after being seen in the emergency department:      Incision and Drainage Anus/Rectum  93845 - VT I&D ISCHIORECTAL&/PERIRECTAL ABSCESS SPX     Rectal exam under anesthesia     Diagnostic anoscopy     She was discharged to home postoperatively on tramadol and Augmentin, with the drain in place.  Please see the operative note for details.     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 Narendra Ruiz from the [facility] in a two-step procedure.      Since discharge, the patient has done quite well.  She has irritation from the  drain.  She did have pain for the first 2 to 3 days relieved by tramadol.  She has had no fever chills or sweats.  She is eating well with normal bowel habits.  She finished the Augmentin prescription.  She returned to work on 3/11/2024, 2 days ago.     The patient is single.  She lives in [place].  Her friend, Yanely Christianson, is a former patient of mine.  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     Past Medical History   Medical History   No past medical history on file.      Surgical History    Surgical History             Past Surgical History:   Procedure Laterality Date    LUMBAR FUSION                Allergies   RX Allergies   No Known Allergies         Home Meds          Current Outpatient Medications   Medication Instructions    celecoxib (CeleBREX) 200 mg capsule 1 capsule, oral, Daily before breakfast    citalopram (CELEXA) 20 mg, oral, Daily, Every afternoon    diclofenac sodium (Voltaren) 1 % gel Topical, 4 times daily    DOCOSAHEXAENOIC ACID ORAL oral, Daily RT    Golytely 236-22.74-6.74 -5.86 gram solution MIX AND DRINK AS DIRECTED    lifitegrast (Xiidra) 5 % dropperette ophthalmic (eye), Take per directed    mupirocin (Bactroban) 2 % ointment nasal         Family History    Family History   No family history on file.         Social History  Social History   Social History                Tobacco Use    Smoking status: Never    Smokeless tobacco: Never   Substance Use Topics    Alcohol use: Yes       Comment: occasional    Drug use: Yes       Types: Marijuana       Comment: medical            Review Of Systems    Review of Systems     General: Not Present- Obesity, Cancer, HIV, MRSA, Recent Cold/Flu, Tired During the Day and VRE.  HEENT: Not Present- Migraine, Cataracts, Glaucoma, Macular Degeneration and Retinal Detachment.  Respiratory: Not Present- Asthma, Chronic Cough, Difficulty Breathing on Exertion, Difficulty  Breathing at Rest, Emphysema, Frequent Bronchitis, Home CPAP/BiPAP, Home Oxygen, Pulmonary Embolus, Pneumonia/TB, Sleep Apnea and Snoring.  Cardiovascular: Not Present- Chest Pain, Congestive Heart Failure, Heart Attack, Coronary Artery Disease, Heart Stent, High Cholesterol/Lipids, Hypertension, Internal Defibrillator, Irregular Heart Beat, Mitral Valve Prolapse, Murmur, Pacemaker and Peripheral Vascular Disease.  Gastrointestinal: Not Present- Heartburn, Hepatitis, Hiatal Hernia, Jaundice, Reflux, Stomach Ulcer and IBS.  Female Genitourinary: Not Present- Kidney Failure, Kidney Stones, Dialysis and Urinary Tract Infection.  Musculoskeletal: Not Present- Arthritis, Back Pain and Fibromyalgia.  Neurological: Not Present- Headaches, Numbness, Tingling, Seizures, Stroke,  Shunt and Weakness.  Psychiatric: Not Present- Anxiety, Bipolar and Panic Attacks.  Endocrine: Not Present- Diabetes, Hyperthyroidism, Hypothyroidism and Low Blood Sugar.  Hematology: Not Present- Abnormal Bleeding, Anemia and Blood Clots.     Vitals  There were no vitals taken for this visit.         Physical Exam      Aleshia, my medical assistant, chaperoned and assisted     The patient was examined in the prone jackknife position on the proctoscopy table     Anorectal exam:     Left lateral:      The left lateral lateral drain site is completely healed epithelialized.     The 5 cm left lateral radial wound is completely healed and epithelialized; however centrally along the scar there is a 1 cm area of thin epithelium and softness.  This was not open.      Digital rectal exam revealed normal sphincter tone was nontender with no masses or blood.  I could not palpate any induration left laterally.    DIAGNOSTIC ANOSCOPY: Using a medium stainless steel anoscope with direct fiberoptic lighting, diagnostic anoscopy was performed and this was negative.  The mucosa, the dentate line, and the anodern all appear normal, particularly left laterally  adjacent to the scar.  I can identify no internal opening.     Assessment/Plan      Julissa had a recurrent left anterior perirectal or ischiorectal abscess, and this was drained on 9/11/2024.      The drain was removed on 9/18/2024.      This was previously drained emergently on 3/2/2024.      Patient also has a history of a previous fistulotomy and seton placement on 8/7/2014 at [facility] per Dr. Ruiz.      The patient has had recurrent drainage from the scar with 2 episodes over the last roughly 4-5 weeks.  A high or supralevator fistula is suspected.       Recommendations:       No activity or dietary restrictions    She should complete her current antibiotics.    I have advised her to return to see Dr. Narendra Ruiz from colorectal surgery at [facility], who she has seen in the past, for further evaluation and treatment of the persistent perianal drainage and to rule out fistula.  MRI imaging may be indicated.     I think there is a significant chance of recurrent perianal drainage.  If this recurs, the patient will call me and I will prescribe antibiotics and sitz baths, in the meantime until she sees Dr. Ruiz.      In the meantime, she she may call or follow-up as needed.

## 2024-12-23 ENCOUNTER — TELEPHONE (OUTPATIENT)
Dept: SURGERY | Facility: CLINIC | Age: 54
End: 2024-12-23
Payer: COMMERCIAL

## 2024-12-23 DIAGNOSIS — K61.1 PERIRECTAL ABSCESS: Primary | ICD-10-CM

## 2024-12-23 RX ORDER — AMOXICILLIN AND CLAVULANATE POTASSIUM 875; 125 MG/1; MG/1
1 TABLET, FILM COATED ORAL 2 TIMES DAILY
Qty: 20 TABLET | Refills: 0 | Status: SHIPPED | OUTPATIENT
Start: 2024-12-23 | End: 2025-01-02

## 2024-12-23 NOTE — PROGRESS NOTES
Covering for Dr. Lexa Pillai.  I reviewed his most recent progress note.  The patient has recurrent drainage from her perirectal abscess.  Dr. Pillai has recommended she see her colorectal surgeon at [facility].  He also recommended antibiotics if the patient has recurrent symptoms.  He previously prescribed Augmentin.  I ordered Augmentin 875 mg p.o. twice daily for 10 days.

## 2024-12-23 NOTE — TELEPHONE ENCOUNTER
Spoke with patient regarding answering service message on 12/21/2024. Pt states wound opened up again with drainage and would like antibiotic. She states she has no fever but the area feels irritated. Reached out to Dr. Conner for advise.

## 2024-12-23 NOTE — TELEPHONE ENCOUNTER
Spoke with pt that Dr. Conner prescribed amoxicillin-pot clavulanate (Augmentin) 875-125 mg tablet and will notify Dr. Pillai tomorrow when he returns to clinic.

## (undated) DEVICE — STRAP, STIRRUP, W/ SLIP RING

## (undated) DEVICE — DRAIN, PENROSE, 0.5 X 12 IN, LATEX, STERILE

## (undated) DEVICE — Device

## (undated) DEVICE — SLEEVE, VASO PRESS, CALF GARMENT, MEDIUM, GREEN

## (undated) DEVICE — NEEDLE, HYPODERMIC, SAFETYGLIDE, SHIELDING, REGULAR WALL, REGULAR BEVEL, 22 G X 1.5 IN, BLACK HUB

## (undated) DEVICE — DRESSING, GAUZE, SPONGE, 12 PLY, CURITY, 4 X 4 IN, RIGID TRAY, STERILE

## (undated) DEVICE — BANDAGE, GAUZE, CONFORMING, KERLIX, 6 PLY, 4.5 IN X 4.1 YD

## (undated) DEVICE — DRAPE, INSTRUMENT, W/POUCH, STERI DRAPE, 7 X 11 IN, DISPOSABLE, STERILE

## (undated) DEVICE — BRIEF, STRETCH, XXXLARGE, MESH PANTS

## (undated) DEVICE — DRESSING, ABDOMINAL, TENDERSORB, 8 X 7-1/2 IN, STERILE

## (undated) DEVICE — APPLICATOR, CHLORAPREP, W/ORANGE TINT, 26ML

## (undated) DEVICE — DRAPE, LEGGINGS, 48 X 31 IN, STERILE, LF

## (undated) DEVICE — DRESSING, DRAIN SPONGE, EXCILON AMD, 4X4 IN, 6 PLY, ST